# Patient Record
Sex: FEMALE | Race: WHITE | Employment: OTHER | URBAN - METROPOLITAN AREA
[De-identification: names, ages, dates, MRNs, and addresses within clinical notes are randomized per-mention and may not be internally consistent; named-entity substitution may affect disease eponyms.]

---

## 2017-11-14 ENCOUNTER — OFFICE VISIT (OUTPATIENT)
Dept: FAMILY MEDICINE CLINIC | Facility: CLINIC | Age: 32
End: 2017-11-14

## 2017-11-14 VITALS
TEMPERATURE: 98 F | HEIGHT: 71 IN | RESPIRATION RATE: 18 BRPM | WEIGHT: 255 LBS | DIASTOLIC BLOOD PRESSURE: 88 MMHG | BODY MASS INDEX: 35.7 KG/M2 | HEART RATE: 93 BPM | SYSTOLIC BLOOD PRESSURE: 125 MMHG

## 2017-11-14 DIAGNOSIS — F41.9 ANXIETY AND DEPRESSION: ICD-10-CM

## 2017-11-14 DIAGNOSIS — R09.81 NASAL CONGESTION: ICD-10-CM

## 2017-11-14 DIAGNOSIS — M79.7 FIBROMYALGIA: ICD-10-CM

## 2017-11-14 DIAGNOSIS — F32.A ANXIETY AND DEPRESSION: ICD-10-CM

## 2017-11-14 DIAGNOSIS — E11.9 TYPE 2 DIABETES MELLITUS WITHOUT COMPLICATION, WITHOUT LONG-TERM CURRENT USE OF INSULIN (HCC): Primary | ICD-10-CM

## 2017-11-14 PROCEDURE — 99203 OFFICE O/P NEW LOW 30 MIN: CPT | Performed by: FAMILY MEDICINE

## 2017-11-14 PROCEDURE — 99212 OFFICE O/P EST SF 10 MIN: CPT | Performed by: FAMILY MEDICINE

## 2017-11-14 RX ORDER — FLUTICASONE PROPIONATE 50 MCG
SPRAY, SUSPENSION (ML) NASAL DAILY
COMMUNITY
End: 2017-11-14

## 2017-11-14 RX ORDER — CETIRIZINE HYDROCHLORIDE 10 MG/1
10 TABLET ORAL DAILY
COMMUNITY
End: 2020-01-28

## 2017-11-14 RX ORDER — FLUTICASONE PROPIONATE 50 MCG
1 SPRAY, SUSPENSION (ML) NASAL DAILY
Qty: 1 INHALER | Refills: 1 | Status: SHIPPED | OUTPATIENT
Start: 2017-11-14 | End: 2019-04-16

## 2017-11-14 NOTE — PROGRESS NOTES
HPI:    Elinor Frias is a 27year old female presents to clinic to establish care. Recently moved from [facility].   Has Type 2 DM. Last A1C was 8.3 before moving, was started on glipizide but was hesitant to try it.  Takes Metformin twice da Positive for joint pain and myalgias. Skin: Negative. Psychiatric/Behavioral: Positive for depression. The patient is nervous/anxious.           PHYSICAL EXAM:      11/14/17  1436   BP: 125/88   Pulse: 93   Resp: 18   Temp: 98.2 °F (36.8 °C)   TempSrc:

## 2017-11-20 PROBLEM — F41.1 GENERALIZED ANXIETY DISORDER: Status: ACTIVE | Noted: 2017-11-20

## 2017-11-20 PROBLEM — F33.1 MAJOR DEPRESSIVE DISORDER, RECURRENT EPISODE, MODERATE (HCC): Status: ACTIVE | Noted: 2017-11-20

## 2017-11-22 ENCOUNTER — OFFICE VISIT (OUTPATIENT)
Dept: ENDOCRINOLOGY CLINIC | Facility: CLINIC | Age: 32
End: 2017-11-22

## 2017-11-22 VITALS
BODY MASS INDEX: 36.68 KG/M2 | SYSTOLIC BLOOD PRESSURE: 119 MMHG | WEIGHT: 262 LBS | HEART RATE: 87 BPM | HEIGHT: 71 IN | DIASTOLIC BLOOD PRESSURE: 83 MMHG

## 2017-11-22 DIAGNOSIS — E11.65 UNCONTROLLED TYPE 2 DIABETES MELLITUS WITH HYPERGLYCEMIA, WITHOUT LONG-TERM CURRENT USE OF INSULIN (HCC): Primary | ICD-10-CM

## 2017-11-22 DIAGNOSIS — Z13.220 LIPID SCREENING: ICD-10-CM

## 2017-11-22 PROCEDURE — 99244 OFF/OP CNSLTJ NEW/EST MOD 40: CPT | Performed by: INTERNAL MEDICINE

## 2017-11-22 PROCEDURE — 82962 GLUCOSE BLOOD TEST: CPT | Performed by: INTERNAL MEDICINE

## 2017-11-22 PROCEDURE — 36416 COLLJ CAPILLARY BLOOD SPEC: CPT | Performed by: INTERNAL MEDICINE

## 2017-11-22 PROCEDURE — 99212 OFFICE O/P EST SF 10 MIN: CPT | Performed by: INTERNAL MEDICINE

## 2017-11-22 RX ORDER — BLOOD SUGAR DIAGNOSTIC
STRIP MISCELLANEOUS
Qty: 100 STRIP | Refills: 0 | Status: SHIPPED | OUTPATIENT
Start: 2017-11-22

## 2017-11-22 NOTE — H&P
New Patient Visit - Diabetes    CONSULT - Reason for Visit:  Diabetes management.     Requesting Physician: Alfredito Dugan MD    CHIEF COMPLAINT:  Patient presents with:  Diabetes       HISTORY OF PRESENT ILLNESS:   Clovis Teixeira is a 27year old Smokeless tobacco: Never Used                          FAMILY HISTORY:   History reviewed. No pertinent family history.     ASSESSMENTS:        REVIEW OF SYSTEMS:  Constitutional: Negative for: weight change, fever, f Patient understands the importance of glycemic control and the implications of uncontrolled diabetes including Diabetic ketoacidosis and various micro vascular and macrovascular complications. Goal a1c: < 7 %      a).  Medications:    Patient says that s

## 2017-11-29 PROBLEM — F42.3 HOARDING DISORDER: Status: ACTIVE | Noted: 2017-11-29

## 2017-12-15 ENCOUNTER — APPOINTMENT (OUTPATIENT)
Dept: LAB | Age: 32
End: 2017-12-15
Attending: INTERNAL MEDICINE
Payer: COMMERCIAL

## 2017-12-15 DIAGNOSIS — R53.83 FATIGUE, UNSPECIFIED TYPE: ICD-10-CM

## 2017-12-15 DIAGNOSIS — Z13.220 LIPID SCREENING: ICD-10-CM

## 2017-12-15 DIAGNOSIS — E11.65 UNCONTROLLED TYPE 2 DIABETES MELLITUS WITH HYPERGLYCEMIA, WITHOUT LONG-TERM CURRENT USE OF INSULIN (HCC): ICD-10-CM

## 2017-12-15 PROBLEM — M79.7 FIBROMYALGIA: Status: ACTIVE | Noted: 2017-12-15

## 2017-12-15 PROCEDURE — 85027 COMPLETE CBC AUTOMATED: CPT

## 2017-12-15 PROCEDURE — 84443 ASSAY THYROID STIM HORMONE: CPT

## 2017-12-15 PROCEDURE — 82570 ASSAY OF URINE CREATININE: CPT

## 2017-12-15 PROCEDURE — 82043 UR ALBUMIN QUANTITATIVE: CPT

## 2017-12-15 PROCEDURE — 36415 COLL VENOUS BLD VENIPUNCTURE: CPT

## 2017-12-15 PROCEDURE — 80053 COMPREHEN METABOLIC PANEL: CPT

## 2017-12-15 PROCEDURE — 80061 LIPID PANEL: CPT

## 2017-12-15 NOTE — PROGRESS NOTES
Dear Samy Rossi:    I saw your patient Sae Hoover in consultation this afternoon at your request for evaluation of fibromyalgia. As you know, she is a 33-year-old woman who for years has had occasional low back pain, stomach cramps, carpal tunnel. outside. No fevers. She has anorexia but her weight is stable. No rash. Her hair is been thinning 1-1/2 years as did her mother's. No history of internal eye inflammation. She has diabetic eye disease. No oral or nasal ulcers, lymphadenopathy.   No sh

## 2017-12-16 ENCOUNTER — TELEPHONE (OUTPATIENT)
Dept: ENDOCRINOLOGY CLINIC | Facility: CLINIC | Age: 32
End: 2017-12-16

## 2017-12-29 ENCOUNTER — OFFICE VISIT (OUTPATIENT)
Dept: RHEUMATOLOGY | Facility: CLINIC | Age: 32
End: 2017-12-29

## 2017-12-29 VITALS
SYSTOLIC BLOOD PRESSURE: 130 MMHG | BODY MASS INDEX: 36.12 KG/M2 | DIASTOLIC BLOOD PRESSURE: 87 MMHG | WEIGHT: 258 LBS | HEIGHT: 71 IN | HEART RATE: 86 BPM

## 2017-12-29 DIAGNOSIS — E11.65 TYPE 2 DIABETES MELLITUS WITH HYPERGLYCEMIA, WITHOUT LONG-TERM CURRENT USE OF INSULIN (HCC): ICD-10-CM

## 2017-12-29 DIAGNOSIS — M79.7 FIBROMYALGIA: ICD-10-CM

## 2017-12-29 DIAGNOSIS — R53.83 FATIGUE, UNSPECIFIED TYPE: Primary | ICD-10-CM

## 2017-12-29 PROCEDURE — 99213 OFFICE O/P EST LOW 20 MIN: CPT | Performed by: INTERNAL MEDICINE

## 2017-12-29 PROCEDURE — 99212 OFFICE O/P EST SF 10 MIN: CPT | Performed by: INTERNAL MEDICINE

## 2017-12-29 NOTE — PROGRESS NOTES
Dear Montserrat Da Silva:    I saw Daylin Barrow in follow-up this afternoon in my rheumatology clinic. After I saw her several weeks ago, she did very well and was sleeping fine, as long as she walks her dog.   Yesterday she did a lot of housework, and since ha

## 2018-01-02 ENCOUNTER — TELEPHONE (OUTPATIENT)
Dept: RHEUMATOLOGY | Facility: CLINIC | Age: 33
End: 2018-01-02

## 2018-01-03 NOTE — TELEPHONE ENCOUNTER
Left message sleep study does not require a PA. Left message for pt to call sleep center at [phone] and schedule.  Insurance contacted at [phone], USD#[phone]

## 2018-01-19 ENCOUNTER — PATIENT MESSAGE (OUTPATIENT)
Dept: ENDOCRINOLOGY CLINIC | Facility: CLINIC | Age: 33
End: 2018-01-19

## 2018-01-19 NOTE — TELEPHONE ENCOUNTER
From: Scott Crawford  To: Carolina Alanis MD  Sent: 1/19/2018 2:39 PM CST  Subject: Prescription Question    [facility]    I have run out of Metformin that I had from [place]. Can I please request a refill?     [facility]

## 2018-02-10 ENCOUNTER — PATIENT MESSAGE (OUTPATIENT)
Dept: FAMILY MEDICINE CLINIC | Facility: CLINIC | Age: 33
End: 2018-02-10

## 2018-02-10 ENCOUNTER — TELEPHONE (OUTPATIENT)
Dept: FAMILY MEDICINE CLINIC | Facility: CLINIC | Age: 33
End: 2018-02-10

## 2018-02-10 NOTE — TELEPHONE ENCOUNTER
From: Alison Saba  To: Susan Ocasio MD  Sent: 2/10/2018 10:17 AM CST  Subject: Non-Urgent Lashon Clarity Dr,    I am in [place] where it’s 70 degrees, taking care of my ill mother.  The night before I left I got a cough with phlegm

## 2018-02-10 NOTE — TELEPHONE ENCOUNTER
From: Kari Bryant  To: Carlos Gasca MD  Sent: 2/10/2018 10:17 AM CST  Subject: Non-Urgent Reford Naomi Abel,    I am in [place] where it’s 70 degrees, taking care  of my ill mother.  The night before I left I got a cough with phlegm
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
## 2018-03-14 ENCOUNTER — OFFICE VISIT (OUTPATIENT)
Dept: FAMILY MEDICINE CLINIC | Facility: CLINIC | Age: 33
End: 2018-03-14

## 2018-03-14 ENCOUNTER — OFFICE VISIT (OUTPATIENT)
Dept: ENDOCRINOLOGY CLINIC | Facility: CLINIC | Age: 33
End: 2018-03-14

## 2018-03-14 VITALS
OXYGEN SATURATION: 98 % | SYSTOLIC BLOOD PRESSURE: 129 MMHG | WEIGHT: 257 LBS | DIASTOLIC BLOOD PRESSURE: 90 MMHG | RESPIRATION RATE: 14 BRPM | HEART RATE: 87 BPM | TEMPERATURE: 97 F | BODY MASS INDEX: 36.79 KG/M2 | HEIGHT: 70 IN

## 2018-03-14 VITALS
BODY MASS INDEX: 36.79 KG/M2 | HEIGHT: 70 IN | DIASTOLIC BLOOD PRESSURE: 89 MMHG | SYSTOLIC BLOOD PRESSURE: 128 MMHG | WEIGHT: 257 LBS | HEART RATE: 91 BPM

## 2018-03-14 DIAGNOSIS — J30.2 SEASONAL ALLERGIC RHINITIS, UNSPECIFIED TRIGGER: Primary | ICD-10-CM

## 2018-03-14 DIAGNOSIS — F41.9 ANXIETY: ICD-10-CM

## 2018-03-14 DIAGNOSIS — E11.65 UNCONTROLLED TYPE 2 DIABETES MELLITUS WITH HYPERGLYCEMIA, WITHOUT LONG-TERM CURRENT USE OF INSULIN (HCC): Primary | ICD-10-CM

## 2018-03-14 LAB
CARTRIDGE LOT#: ABNORMAL NUMERIC
GLUCOSE BLOOD: 134
HEMOGLOBIN A1C: 7.3 % (ref 4.3–5.6)
TEST STRIP LOT #: NORMAL NUMERIC

## 2018-03-14 PROCEDURE — 99213 OFFICE O/P EST LOW 20 MIN: CPT | Performed by: INTERNAL MEDICINE

## 2018-03-14 PROCEDURE — 99214 OFFICE O/P EST MOD 30 MIN: CPT | Performed by: FAMILY MEDICINE

## 2018-03-14 PROCEDURE — 82962 GLUCOSE BLOOD TEST: CPT | Performed by: INTERNAL MEDICINE

## 2018-03-14 PROCEDURE — 83036 HEMOGLOBIN GLYCOSYLATED A1C: CPT | Performed by: INTERNAL MEDICINE

## 2018-03-14 PROCEDURE — 36416 COLLJ CAPILLARY BLOOD SPEC: CPT | Performed by: INTERNAL MEDICINE

## 2018-03-14 PROCEDURE — 99212 OFFICE O/P EST SF 10 MIN: CPT | Performed by: INTERNAL MEDICINE

## 2018-03-14 PROCEDURE — 99212 OFFICE O/P EST SF 10 MIN: CPT | Performed by: FAMILY MEDICINE

## 2018-03-14 RX ORDER — MONTELUKAST SODIUM 10 MG/1
10 TABLET ORAL NIGHTLY
Qty: 90 TABLET | Refills: 0 | Status: SHIPPED | OUTPATIENT
Start: 2018-03-14 | End: 2019-04-24

## 2018-03-14 NOTE — PROGRESS NOTES
Return Office Visit     CHIEF COMPLAINT:  Patient presents with:  Diabetes       HISTORY OF PRESENT ILLNESS:  Cesar Rivera is a 27year old female who presents for follow up for for DM.      DM HISTORY  Diagnosed: 2015        HISTORY OF DIABETES route daily. Disp: 1 Inhaler Rfl: 1   Cyclobenzaprine HCl 5 MG Oral Tab Take 1-2 PO Q HS.  Disp: 60 tablet Rfl: 2         ALLERGY:    Amoxicillin             Swelling  Dust                      Pollen                    Smoke                       PAST MEDI regular rate and rhythm, normal S1 and S2  ABDOMEN:  normal bowel sounds, soft, non-distended, non-tender  SKIN:  no bruising or bleeding, no rashes and no lesions  EXTREMITIES: normal pulses, no edema      DATA:     Labs reviewed      She had an A1c of 8. [[id number]]      3/14/2018  Maryanne Martins MD

## 2018-03-16 ENCOUNTER — PATIENT MESSAGE (OUTPATIENT)
Dept: FAMILY MEDICINE CLINIC | Facility: CLINIC | Age: 33
End: 2018-03-16

## 2018-03-16 ENCOUNTER — NURSE TRIAGE (OUTPATIENT)
Dept: OTHER | Age: 33
End: 2018-03-16

## 2018-03-16 NOTE — PROGRESS NOTES
HPI:    Mateo Mulligan is a 56 [address] Stre[address] old female presents to clinic with concerns regarding her allergies.  States that she was in  [place] in a jimmy, humid environment, notes that she was experiencing headaches, and increase in congestion, and Suspension 1 spray by Each Nare route daily. Disp: 1 Inhaler Rfl: 1   MetFORMIN HCl 500 MG Oral Tab Take 2 tablets (1,000 mg total) by mouth 2 (two) times daily with meals. Disp: 360 tablet Rfl: 0   Cyclobenzaprine HCl 5 MG Oral Tab Take 1-2 PO Q HS.  Disp: breath sounds normal. No respiratory distress. She has no wheezes. She has no rales. Lymphadenopathy:     She has no cervical adenopathy. Neurological: She is alert. Gait normal.   Vitals reviewed.     ASSESSMENT/PLAN:   (J30.2) Seasonal allergic rhinit

## 2018-03-16 NOTE — TELEPHONE ENCOUNTER
From: Tiffanie Boothe  To: Javon Remy MD  Sent: 3/16/2018  1:36 PM CDT  Subject: Non-Urgent Medical Question    Hi Dr Kelly Titus,    I bent over to pick something up and I had shooting pain in my lower back, left hip, and down my left leg.  I need

## 2018-03-16 NOTE — TELEPHONE ENCOUNTER
Advised patient of Dr. Marialuisa Macias note. Patient verbalized understanding    States she still has some Flexeril and will try it tonight. States she tried Advil, but it is only helping a little.

## 2018-03-16 NOTE — TELEPHONE ENCOUNTER
From: Thad Collins  To: Laina Potter MD  Sent: 3/16/2018 1:36 PM CDT  Subject: Non-Urgent Medical Question    Hi Dr eGmini Alcantara,    I bent over to pick something up and I had shooting pain in my lower back, left hip, and down my left leg.  I neede

## 2018-03-16 NOTE — TELEPHONE ENCOUNTER
Action Requested: Summary for Provider     []  Critical Lab, Recommendations Needed  [] Need Additional Advice  [x]   FYI    []   Need Orders  [] Need Medications Sent to Pharmacy  []  Other     SUMMARY: Contacted patient in regard to My Chart message.    P

## 2018-05-29 RX ORDER — ALBUTEROL SULFATE 90 UG/1
2 AEROSOL, METERED RESPIRATORY (INHALATION) EVERY 4 HOURS PRN
Qty: 1 INHALER | Refills: 0 | Status: SHIPPED | OUTPATIENT
Start: 2018-05-29 | End: 2019-04-16

## 2018-05-29 NOTE — TELEPHONE ENCOUNTER
Please advise on refill request.     Refill Protocol Appointment Criteria  · Appointment scheduled in the past 6 months or in the next 3 months  Recent Outpatient Visits            2 months ago Uncontrolled type 2 diabetes mellitus with hyperglycemia, with

## 2018-06-25 ENCOUNTER — PATIENT MESSAGE (OUTPATIENT)
Dept: ENDOCRINOLOGY CLINIC | Facility: CLINIC | Age: 33
End: 2018-06-25

## 2018-06-26 RX ORDER — GLIPIZIDE 2.5 MG/1
2.5 TABLET, EXTENDED RELEASE ORAL
Qty: 90 TABLET | Refills: 0 | Status: SHIPPED | OUTPATIENT
Start: 2018-06-26 | End: 2018-09-22

## 2018-06-26 NOTE — TELEPHONE ENCOUNTER
From: Doreen Rising  To: Lizeth Sanon MD  Sent: 6/25/2018 6:46 PM CDT  Subject: Alla Gates Dr,    I haven't been doing my sugars strictly but maybe once to twice a week and they are typically 130-170 every morning.  I am wr

## 2018-07-09 ENCOUNTER — TELEPHONE (OUTPATIENT)
Dept: ENDOCRINOLOGY CLINIC | Facility: CLINIC | Age: 33
End: 2018-07-09

## 2018-07-09 NOTE — TELEPHONE ENCOUNTER
Regarding: Non-Urgent Medical Question  Contact: [phone]  ----- Message from Cherylene Mis, RN sent at 7/9/2018  1:50 PM CDT -----       ----- Message from Daylin Barrow to Obdulia Olivera MD sent at 7/9/2018  1:20 PM -----   Tanner Luther

## 2018-07-09 NOTE — TELEPHONE ENCOUNTER
Patient returned call but was disconnected. If calls back ok to offer sooner appt with AM than she has booked for regular DM follow up.

## 2018-07-09 NOTE — TELEPHONE ENCOUNTER
Pt calling back - I offered her appt for tomorrow but she cannot take it - next opening is 7/23 - pls advise

## 2018-07-10 PROBLEM — F33.0 MAJOR DEPRESSIVE DISORDER, RECURRENT EPISODE, MILD: Status: ACTIVE | Noted: 2018-07-10

## 2018-07-10 PROBLEM — F33.0 MAJOR DEPRESSIVE DISORDER, RECURRENT EPISODE, MILD (HCC): Status: ACTIVE | Noted: 2018-07-10

## 2018-07-11 NOTE — TELEPHONE ENCOUNTER
Called the patient. Double booked for Monday afternoon as it was the time patient could come in to be seen.

## 2018-07-16 ENCOUNTER — OFFICE VISIT (OUTPATIENT)
Dept: ENDOCRINOLOGY CLINIC | Facility: CLINIC | Age: 33
End: 2018-07-16

## 2018-07-16 VITALS
TEMPERATURE: 98 F | HEIGHT: 70 IN | SYSTOLIC BLOOD PRESSURE: 127 MMHG | HEART RATE: 89 BPM | DIASTOLIC BLOOD PRESSURE: 89 MMHG | BODY MASS INDEX: 37.51 KG/M2 | WEIGHT: 262 LBS

## 2018-07-16 DIAGNOSIS — E11.8 TYPE 2 DIABETES MELLITUS WITH COMPLICATION, WITHOUT LONG-TERM CURRENT USE OF INSULIN (HCC): Primary | ICD-10-CM

## 2018-07-16 LAB
CARTRIDGE LOT#: ABNORMAL NUMERIC
GLUCOSE BLOOD: 149
HEMOGLOBIN A1C: 8.4 % (ref 4.3–5.6)
TEST STRIP LOT #: NORMAL NUMERIC

## 2018-07-16 PROCEDURE — 36416 COLLJ CAPILLARY BLOOD SPEC: CPT | Performed by: INTERNAL MEDICINE

## 2018-07-16 PROCEDURE — 82962 GLUCOSE BLOOD TEST: CPT | Performed by: INTERNAL MEDICINE

## 2018-07-16 PROCEDURE — 83036 HEMOGLOBIN GLYCOSYLATED A1C: CPT | Performed by: INTERNAL MEDICINE

## 2018-07-16 PROCEDURE — 99213 OFFICE O/P EST LOW 20 MIN: CPT | Performed by: INTERNAL MEDICINE

## 2018-07-16 PROCEDURE — 99212 OFFICE O/P EST SF 10 MIN: CPT | Performed by: INTERNAL MEDICINE

## 2018-07-16 NOTE — PROGRESS NOTES
Return Office Visit     CHIEF COMPLAINT:  Patient presents with:  Diabetes: Patient is a 27year old female here for follow up.  LOV was in 3/14/18       HISTORY OF PRESENT ILLNESS:  Agueda Parada is a 27year old female who presents for follow up Fluticasone Propionate 50 MCG/ACT Nasal Suspension 1 spray by Each Nare route daily.  Disp: 1 Inhaler Rfl: 1         ALLERGY:    Amoxicillin             SWELLING  Dust                      Pollen                    Smoke                       PAST MEDICAL crackles or wheezing  CARDIOVASCULAR:  regular rate and rhythm, normal S1 and S2  ABDOMEN:  normal bowel sounds, soft, non-distended, non-tender  SKIN:  no bruising or bleeding, no rashes and no lesions  EXTREMITIES: normal pulses, no edema      DATA: pregnant.      RTC in three months.    She will my chart/ call as discussed above.          Orders Placed This Encounter      POC Glycohemoglobin [63740]      POC Finger stick glucose Donavon Lam MD

## 2018-09-15 ENCOUNTER — TELEPHONE (OUTPATIENT)
Dept: ENDOCRINOLOGY CLINIC | Facility: CLINIC | Age: 33
End: 2018-09-15

## 2018-09-24 RX ORDER — GLIPIZIDE 2.5 MG/1
2.5 TABLET, EXTENDED RELEASE ORAL
Qty: 90 TABLET | Refills: 0 | Status: SHIPPED | OUTPATIENT
Start: 2018-09-24 | End: 2018-12-28

## 2018-10-24 ENCOUNTER — OFFICE VISIT (OUTPATIENT)
Dept: ENDOCRINOLOGY CLINIC | Facility: CLINIC | Age: 33
End: 2018-10-24
Payer: COMMERCIAL

## 2018-10-24 VITALS
SYSTOLIC BLOOD PRESSURE: 131 MMHG | DIASTOLIC BLOOD PRESSURE: 87 MMHG | BODY MASS INDEX: 36.48 KG/M2 | WEIGHT: 254.81 LBS | HEIGHT: 70 IN | HEART RATE: 86 BPM

## 2018-10-24 DIAGNOSIS — E66.9 OBESITY WITHOUT SERIOUS COMORBIDITY, UNSPECIFIED CLASSIFICATION, UNSPECIFIED OBESITY TYPE: ICD-10-CM

## 2018-10-24 DIAGNOSIS — E11.65 UNCONTROLLED TYPE 2 DIABETES MELLITUS WITH HYPERGLYCEMIA (HCC): Primary | ICD-10-CM

## 2018-10-24 PROCEDURE — 99212 OFFICE O/P EST SF 10 MIN: CPT | Performed by: INTERNAL MEDICINE

## 2018-10-24 PROCEDURE — 83036 HEMOGLOBIN GLYCOSYLATED A1C: CPT | Performed by: INTERNAL MEDICINE

## 2018-10-24 PROCEDURE — 36416 COLLJ CAPILLARY BLOOD SPEC: CPT | Performed by: INTERNAL MEDICINE

## 2018-10-24 PROCEDURE — 82962 GLUCOSE BLOOD TEST: CPT | Performed by: INTERNAL MEDICINE

## 2018-10-24 PROCEDURE — 99214 OFFICE O/P EST MOD 30 MIN: CPT | Performed by: INTERNAL MEDICINE

## 2018-10-24 RX ORDER — LORATADINE 10 MG/1
10 TABLET ORAL DAILY
COMMUNITY
End: 2021-09-16

## 2018-10-24 NOTE — PROGRESS NOTES
Return Office Visit     CHIEF COMPLAINT:  Patient presents with:  Diabetes: Retinal scan report in room       HISTORY OF PRESENT ILLNESS:  Kelly Grace is a 26year old female who presents for follow up for for DM.      DM HISTORY  Diagnosed: 201 Oral Tab Take 1 tablet (10 mg total) by mouth nightly. Disp: 90 tablet Rfl: 0   Cyclobenzaprine HCl 5 MG Oral Tab Take 1-2 PO Q HS. (Patient taking differently: as needed.  Take 1-2 PO Q HS. ) Disp: 60 tablet Rfl: 2   cetirizine 10 MG Oral Tab Take 10 mg by wheezing  CARDIOVASCULAR:  regular rate and rhythm, normal S1 and S2  ABDOMEN:  normal bowel sounds, soft, non-distended, non-tender  SKIN:  no bruising or bleeding, no rashes and no lesions  EXTREMITIES: normal pulses, no edema      DATA:     Labs reviewe MD

## 2018-11-11 ENCOUNTER — PATIENT MESSAGE (OUTPATIENT)
Dept: FAMILY MEDICINE CLINIC | Facility: CLINIC | Age: 33
End: 2018-11-11

## 2018-11-12 ENCOUNTER — TELEPHONE (OUTPATIENT)
Dept: OTHER | Age: 33
End: 2018-11-12

## 2018-11-12 NOTE — TELEPHONE ENCOUNTER
Note      From: Codie Rossi  To: Jeanna Carrillo MD  Sent: 11/11/2018  9:18 AM CST  Subject: Other    Hi Dr Kathleen Nolasco     I have been feeling ill for quite sometime about a week or two. Feeling congested, coughing, headaches, and more.      I am h

## 2018-11-12 NOTE — TELEPHONE ENCOUNTER
From: Winferd Goltz  To: Wilmer Carvajal MD  Sent: 11/11/2018 9:18 AM CST  Subject: Other    Hi Dr Parisa Miller     I have been feeling ill for quite sometime about a week or two. Feeling congested, coughing, headaches, and more.      I am hoping to co

## 2018-11-13 ENCOUNTER — OFFICE VISIT (OUTPATIENT)
Dept: FAMILY MEDICINE CLINIC | Facility: CLINIC | Age: 33
End: 2018-11-13
Payer: COMMERCIAL

## 2018-11-13 VITALS
SYSTOLIC BLOOD PRESSURE: 123 MMHG | TEMPERATURE: 98 F | DIASTOLIC BLOOD PRESSURE: 88 MMHG | HEART RATE: 96 BPM | BODY MASS INDEX: 36 KG/M2 | WEIGHT: 254 LBS

## 2018-11-13 DIAGNOSIS — R05.3 PERSISTENT COUGH: Primary | ICD-10-CM

## 2018-11-13 PROCEDURE — 99212 OFFICE O/P EST SF 10 MIN: CPT | Performed by: FAMILY MEDICINE

## 2018-11-13 PROCEDURE — 99213 OFFICE O/P EST LOW 20 MIN: CPT | Performed by: FAMILY MEDICINE

## 2018-11-13 RX ORDER — AZITHROMYCIN 250 MG/1
TABLET, FILM COATED ORAL
Qty: 6 TABLET | Refills: 0 | Status: SHIPPED | OUTPATIENT
Start: 2018-11-13 | End: 2019-04-24

## 2018-11-19 NOTE — PROGRESS NOTES
HPI:    Javan Edmond is a 26year old female presents to clinic with a 10 day history of chills and a cough. Patient has been congested. Feels like there is congestion in her chest that she cant cough up. Notes fatigue and a loss of appetite.  D Take 1,000 mg by mouth every 6 (six) hours as needed. Disp:  Rfl:    Cyclobenzaprine HCl 5 MG Oral Tab Take 1-2 PO Q HS. (Patient taking differently: as needed.  Take 1-2 PO Q HS. ) Disp: 60 tablet Rfl: 2   Glucose Blood (ONETOUCH VERIO) In Vitro [employer] encounter. Meds This Visit:  Requested Prescriptions     Signed Prescriptions Disp Refills   • azithromycin 250 MG Oral Tab 6 tablet 0     Sig: Take two tablets by mouth today, then one daily.        Imaging & Referrals:  None       11/19/2018  Geoffrey Boyer

## 2018-12-20 ENCOUNTER — PATIENT MESSAGE (OUTPATIENT)
Dept: FAMILY MEDICINE CLINIC | Facility: CLINIC | Age: 33
End: 2018-12-20

## 2018-12-21 NOTE — TELEPHONE ENCOUNTER
From: Messi Novak  To: Jessica Castano MD  Sent: 12/20/2018 8:07 AM CST  Subject: Non-Urgent Medical Question    Hi Dr Maxim Lucero,    I am wondering how I would go about having my thyroid tested?  The reason I am asking is because I have horrible n

## 2018-12-28 RX ORDER — GLIPIZIDE 2.5 MG/1
2.5 TABLET, EXTENDED RELEASE ORAL
Qty: 90 TABLET | Refills: 0 | Status: SHIPPED | OUTPATIENT
Start: 2018-12-28 | End: 2019-04-10

## 2018-12-30 ENCOUNTER — HOSPITAL ENCOUNTER (OUTPATIENT)
Age: 33
Discharge: HOME | End: 2018-12-30
Attending: FAMILY MEDICINE
Payer: COMMERCIAL

## 2018-12-30 VITALS
HEART RATE: 82 BPM | DIASTOLIC BLOOD PRESSURE: 90 MMHG | RESPIRATION RATE: 18 BRPM | TEMPERATURE: 98 F | SYSTOLIC BLOOD PRESSURE: 127 MMHG | OXYGEN SATURATION: 100 %

## 2018-12-30 DIAGNOSIS — J02.0 STREPTOCOCCAL SORE THROAT: Primary | ICD-10-CM

## 2018-12-30 LAB — S PYO AG THROAT QL: POSITIVE

## 2018-12-30 PROCEDURE — 87430 STREP A AG IA: CPT

## 2018-12-30 PROCEDURE — 99204 OFFICE O/P NEW MOD 45 MIN: CPT

## 2018-12-30 PROCEDURE — 99213 OFFICE O/P EST LOW 20 MIN: CPT

## 2018-12-30 RX ORDER — CLARITHROMYCIN 500 MG/1
500 TABLET, COATED ORAL 2 TIMES DAILY
Qty: 20 TABLET | Refills: 0 | Status: SHIPPED | OUTPATIENT
Start: 2018-12-30 | End: 2019-01-09

## 2018-12-30 NOTE — ED INITIAL ASSESSMENT (HPI)
Pt here with complaints of bilateral ear pain and sore throat that started 2 days ago, pt states her ears feels like they are burning and it hurts to swallow, pt denies any fevers but states she has had chills

## 2019-01-03 ENCOUNTER — PATIENT MESSAGE (OUTPATIENT)
Dept: FAMILY MEDICINE CLINIC | Facility: CLINIC | Age: 34
End: 2019-01-03

## 2019-01-03 ENCOUNTER — NURSE TRIAGE (OUTPATIENT)
Dept: OTHER | Age: 34
End: 2019-01-03

## 2019-01-04 RX ORDER — AZITHROMYCIN 250 MG/1
TABLET, FILM COATED ORAL
Qty: 6 TABLET | Refills: 0 | Status: SHIPPED | OUTPATIENT
Start: 2019-01-04 | End: 2019-04-24

## 2019-01-04 NOTE — TELEPHONE ENCOUNTER
Action Requested: Summary for Provider     []  Critical Lab, Recommendations Needed  [] Need Additional Advice  []   FYI    []   Need Orders  [] Need Medications Sent to Pharmacy  []  Other     SUMMARY: Dr. Loyda Moser: do you recommend a different abx?     Re

## 2019-01-04 NOTE — TELEPHONE ENCOUNTER
Regarding: Visit Follow-up Question  Contact: [phone]  ----- Message from [address] [place] [address] [address] Generic sent at 1/3/2019 10:18 AM CST -----    Tanner Henriquez,    I went to the urgent care on Sunday and was diagnosed with strep throat.  I was prescribed Clarithrom

## 2019-01-04 NOTE — TELEPHONE ENCOUNTER
From: Doreen Rising  To: Hugo Barrett MD  Sent: 1/3/2019 10:18 AM CST  Subject: Visit Blanche Palencia,    I went to the urgent care on Sunday and was diagnosed with strep throat.  I was prescribed Clarithromycin that is v

## 2019-01-05 NOTE — TELEPHONE ENCOUNTER
The patient stated she did not stop the Clarithromycin due to sinus issues. She is asking if you want her to continue to take the Clarithromycin  And than start the Azithromycin after. Or should she just start the Azithromycin?

## 2019-01-05 NOTE — TELEPHONE ENCOUNTER
From what I can tell, the Azithormycin was called in because she didn't tolerate the Clarithromycin. If she finishes the Clarithromycin, she does not need to take the Azithromycin.

## 2019-01-05 NOTE — TELEPHONE ENCOUNTER
Verified pt name and . Reviewed doctor's medication instructions as noted below. Pt states she feels better today, will continue taking Clarithromycin as long as she is not having any side effects from the medication.     Pt now asking if the Clarithromy

## 2019-02-18 RX ORDER — CYCLOBENZAPRINE HCL 5 MG
TABLET ORAL
Qty: 60 TABLET | Refills: 0 | Status: SHIPPED | OUTPATIENT
Start: 2019-02-18 | End: 2020-01-28

## 2019-02-18 NOTE — TELEPHONE ENCOUNTER
Requested medication: Cyclobenzaprine HCl 5 MG Oral Tab  Last refilled: 12/15/17 #60 tablet 2 refills  LOV: 12/29/17  Future Appointments   Date Time Provider Rachana Hayden   2/22/2019 11:00 AM Angie Kaplan PsyD [facility]   3/13/2019 10:15

## 2019-02-19 NOTE — TELEPHONE ENCOUNTER
Phoned patient and left a message on her voice mail to schedule a f/u with  [facility] for further refills.

## 2019-03-13 ENCOUNTER — OFFICE VISIT (OUTPATIENT)
Dept: ENDOCRINOLOGY CLINIC | Facility: CLINIC | Age: 34
End: 2019-03-13
Payer: COMMERCIAL

## 2019-03-13 VITALS
DIASTOLIC BLOOD PRESSURE: 85 MMHG | SYSTOLIC BLOOD PRESSURE: 124 MMHG | WEIGHT: 258.38 LBS | BODY MASS INDEX: 37 KG/M2 | HEART RATE: 84 BPM

## 2019-03-13 DIAGNOSIS — E11.65 TYPE 2 DIABETES MELLITUS WITH HYPERGLYCEMIA, WITHOUT LONG-TERM CURRENT USE OF INSULIN (HCC): Primary | ICD-10-CM

## 2019-03-13 LAB
CARTRIDGE LOT#: ABNORMAL NUMERIC
GLUCOSE BLOOD: 129
HEMOGLOBIN A1C: 7.3 % (ref 4.3–5.6)
TEST STRIP LOT #: NORMAL NUMERIC

## 2019-03-13 PROCEDURE — 82962 GLUCOSE BLOOD TEST: CPT | Performed by: INTERNAL MEDICINE

## 2019-03-13 PROCEDURE — 83036 HEMOGLOBIN GLYCOSYLATED A1C: CPT | Performed by: INTERNAL MEDICINE

## 2019-03-13 PROCEDURE — 99212 OFFICE O/P EST SF 10 MIN: CPT | Performed by: INTERNAL MEDICINE

## 2019-03-13 PROCEDURE — 99213 OFFICE O/P EST LOW 20 MIN: CPT | Performed by: INTERNAL MEDICINE

## 2019-03-13 PROCEDURE — 36416 COLLJ CAPILLARY BLOOD SPEC: CPT | Performed by: INTERNAL MEDICINE

## 2019-03-13 NOTE — PROGRESS NOTES
Return Office Visit     CHIEF COMPLAINT:  Type 2 DM        HISTORY OF PRESENT ILLNESS:  Anju Locke is a 26year old female who presents for follow up for DM.      DM HISTORY  Diagnosed: 2015        HISTORY OF DIABETES COMPLICATIONS: :  History then one daily. Disp: 6 tablet Rfl: 0   azithromycin 250 MG Oral Tab Take two tablets by mouth today, then one daily. Disp: 6 tablet Rfl: 0   Montelukast Sodium 10 MG Oral Tab Take 1 tablet (10 mg total) by mouth nightly.  Disp: 90 tablet Rfl: 0   cetirizin symmetric, not enlarged and no tenderness  LUNGS: clear to auscultation bilaterally, no crackles or wheezing  CARDIOVASCULAR:  regular rate and rhythm, normal S1 and S2  ABDOMEN:  normal bowel sounds, soft, non-distended, non-tender  SKIN:  no bruising or annual labs.      Orders Placed This Encounter      POC Glycohemoglobin [[id number]]      POC Finger stick glucose Martina Barksdale MD

## 2019-04-02 ENCOUNTER — PATIENT MESSAGE (OUTPATIENT)
Dept: ENDOCRINOLOGY CLINIC | Facility: CLINIC | Age: 34
End: 2019-04-02

## 2019-04-02 NOTE — TELEPHONE ENCOUNTER
Please let her know that we can not say  Specifically that she should get two days off in  A row, but we can say the following:     Please send:     To Whom It May Concern,     Sae Hoover  is currently being treated by me at [address]

## 2019-04-02 NOTE — TELEPHONE ENCOUNTER
Per AM letter written and sent to pt via [facility] [place]. Pt made aware.

## 2019-04-02 NOTE — TELEPHONE ENCOUNTER
From: Sheryl Reyes  To: Ricardo Daly MD  Sent: 4/2/2019 2:14 PM CDT  Subject: Non-Urgent [facility] Dr Bertha Barajas,    I will provide those sugars for you this weekend.  But I do need that drs note for two days off in a row and t

## 2019-04-10 RX ORDER — GLIPIZIDE 2.5 MG/1
2.5 TABLET, EXTENDED RELEASE ORAL
Qty: 90 TABLET | Refills: 0 | Status: SHIPPED | OUTPATIENT
Start: 2019-04-10 | End: 2019-07-14

## 2019-04-17 RX ORDER — FLUTICASONE PROPIONATE 50 MCG
1 SPRAY, SUSPENSION (ML) NASAL DAILY
Qty: 3 INHALER | Refills: 1 | Status: SHIPPED | OUTPATIENT
Start: 2019-04-17 | End: 2021-09-16

## 2019-04-17 RX ORDER — ALBUTEROL SULFATE 90 UG/1
2 AEROSOL, METERED RESPIRATORY (INHALATION) EVERY 4 HOURS PRN
Qty: 3 INHALER | Refills: 1 | Status: SHIPPED | OUTPATIENT
Start: 2019-04-17 | End: 2020-03-15

## 2019-04-17 NOTE — TELEPHONE ENCOUNTER
Refill passed per [facility] protocol. Requested Prescriptions   Pending Prescriptions Disp Refills   • Fluticasone Propionate 50 MCG/ACT Nasal Suspension 1 Inhaler 1     Si spray by Each Nare route daily.        Allergy Medication Protocol Pass

## 2019-04-17 NOTE — TELEPHONE ENCOUNTER
Per refill protocol, #3#1 approved    Requested Prescriptions   Pending Prescriptions Disp Refills   • Albuterol Sulfate HFA (VENTOLIN HFA) 108 (90 Base) MCG/ACT Inhalation Aero Soln 1 Inhaler 0     Sig: Inhale 2 puffs into the lungs every 4 (four) hours a

## 2019-04-24 ENCOUNTER — OFFICE VISIT (OUTPATIENT)
Dept: FAMILY MEDICINE CLINIC | Facility: CLINIC | Age: 34
End: 2019-04-24
Payer: COMMERCIAL

## 2019-04-24 VITALS
SYSTOLIC BLOOD PRESSURE: 132 MMHG | BODY MASS INDEX: 38 KG/M2 | WEIGHT: 262.19 LBS | HEART RATE: 111 BPM | TEMPERATURE: 98 F | DIASTOLIC BLOOD PRESSURE: 88 MMHG | OXYGEN SATURATION: 99 %

## 2019-04-24 DIAGNOSIS — J01.00 ACUTE NON-RECURRENT MAXILLARY SINUSITIS: Primary | ICD-10-CM

## 2019-04-24 PROCEDURE — 99213 OFFICE O/P EST LOW 20 MIN: CPT | Performed by: FAMILY MEDICINE

## 2019-04-24 PROCEDURE — 99212 OFFICE O/P EST SF 10 MIN: CPT | Performed by: FAMILY MEDICINE

## 2019-04-24 RX ORDER — AZITHROMYCIN 250 MG/1
TABLET, FILM COATED ORAL
Qty: 6 TABLET | Refills: 0 | Status: SHIPPED | OUTPATIENT
Start: 2019-04-24 | End: 2019-06-04

## 2019-04-24 NOTE — PROGRESS NOTES
HPI:    Tiffanie Boothe is a 26year old female presents to clinic with a 10-day history of nasal congestion, headaches, cough, and fatigue. Patient also feels clammy and sweaty, notes her body feels hot at night but she has not measured a temp. Glucose Blood (ONETOUCH VERIO) In Vitro Strip Check 2-3 times a day. Disp: 100 strip Rfl: 0   cetirizine 10 MG Oral Tab Take 10 mg by mouth daily.  Disp:  Rfl:        Allergies:    Amoxicillin             SWELLING  Dust                      Pollen This Visit:  Requested Prescriptions      No prescriptions requested or ordered in this encounter       Imaging & Referrals:  None       4/24/2019  Nickolas Munoz MD

## 2019-06-03 ENCOUNTER — NURSE TRIAGE (OUTPATIENT)
Dept: FAMILY MEDICINE CLINIC | Facility: CLINIC | Age: 34
End: 2019-06-03

## 2019-06-03 NOTE — TELEPHONE ENCOUNTER
----- Message from Daylin Barrow sent at 6/2/2019 11:23 AM CDT -----  Regarding: Other  Contact: [phone]  Tanner Limon,    Sorry to interrupt any ones Sunday. This morning I had a scare.  I was doing my hair and my chest (center) got

## 2019-06-03 NOTE — TELEPHONE ENCOUNTER
Spoke to Dr. Avendano Eastland. Per Dr. Argueta Pretty, it is appropriate for patient to see Dr. Parisa Miller tomorrow at 11 a.m.

## 2019-06-03 NOTE — TELEPHONE ENCOUNTER
Patient informed Dr. Leslie Palencia will not be in the office. Instructed patient to go to immediate care.    Per patient, she will go to immediate care but she wants to be called if there is a cancellation with the other providers because her  has an ap

## 2019-06-03 NOTE — TELEPHONE ENCOUNTER
Patient scheduled appointment for chest pains/aches (see triage note below). Advised to go to immediate care. Patient stated she will go to immediate care however scheduled an appointment with Dr. Wallace Henriquez tomorrow at 11:00 a.m.      Dr. Nishant Gomez:   Nicolas

## 2019-06-04 ENCOUNTER — HOSPITAL ENCOUNTER (OUTPATIENT)
Dept: GENERAL RADIOLOGY | Age: 34
Discharge: HOME | End: 2019-06-04
Attending: FAMILY MEDICINE
Payer: COMMERCIAL

## 2019-06-04 ENCOUNTER — LAB ENCOUNTER (OUTPATIENT)
Dept: LAB | Age: 34
End: 2019-06-04
Attending: FAMILY MEDICINE
Payer: COMMERCIAL

## 2019-06-04 ENCOUNTER — OFFICE VISIT (OUTPATIENT)
Dept: FAMILY MEDICINE CLINIC | Facility: CLINIC | Age: 34
End: 2019-06-04
Payer: COMMERCIAL

## 2019-06-04 VITALS
OXYGEN SATURATION: 99 % | TEMPERATURE: 97 F | HEIGHT: 70 IN | BODY MASS INDEX: 37.42 KG/M2 | SYSTOLIC BLOOD PRESSURE: 116 MMHG | DIASTOLIC BLOOD PRESSURE: 78 MMHG | HEART RATE: 81 BPM | WEIGHT: 261.38 LBS

## 2019-06-04 DIAGNOSIS — Z80.3 FAMILY HISTORY OF BREAST CANCER: ICD-10-CM

## 2019-06-04 DIAGNOSIS — D22.9 MULTIPLE NEVI: ICD-10-CM

## 2019-06-04 DIAGNOSIS — R07.9 CHEST PAIN, UNSPECIFIED TYPE: ICD-10-CM

## 2019-06-04 DIAGNOSIS — N64.4 BREAST PAIN: ICD-10-CM

## 2019-06-04 DIAGNOSIS — R07.9 CHEST PAIN, UNSPECIFIED TYPE: Primary | ICD-10-CM

## 2019-06-04 PROCEDURE — 93005 ELECTROCARDIOGRAM TRACING: CPT | Performed by: FAMILY MEDICINE

## 2019-06-04 PROCEDURE — 93000 ELECTROCARDIOGRAM COMPLETE: CPT | Performed by: FAMILY MEDICINE

## 2019-06-04 PROCEDURE — 71046 X-RAY EXAM CHEST 2 VIEWS: CPT | Performed by: FAMILY MEDICINE

## 2019-06-04 PROCEDURE — 85379 FIBRIN DEGRADATION QUANT: CPT

## 2019-06-04 PROCEDURE — 36415 COLL VENOUS BLD VENIPUNCTURE: CPT

## 2019-06-04 PROCEDURE — 99212 OFFICE O/P EST SF 10 MIN: CPT | Performed by: FAMILY MEDICINE

## 2019-06-04 PROCEDURE — 85025 COMPLETE CBC W/AUTO DIFF WBC: CPT

## 2019-06-04 PROCEDURE — 99214 OFFICE O/P EST MOD 30 MIN: CPT | Performed by: FAMILY MEDICINE

## 2019-06-04 NOTE — PROGRESS NOTES
HPI:    Tiffanie Boothe is a 26year old female presents to clinic with a one week history of intermittent chest pain.    Patient states that symptoms started about 1 week back, she had both of her arms raised above her head brushing her hair and s mouth 2 (two) times daily with meals. Disp: 360 tablet Rfl: 0   GLIPIZIDE ER 2.5 MG Oral Tablet 24 Hr TAKE 1 TABLET (2.5 MG TOTAL) BY MOUTH DAILY WITH BREAKFAST. Disp: 90 tablet Rfl: 0   Cyclobenzaprine HCl 5 MG Oral Tab Take 1-2 PO Q HS.  Disp: 60 tablet R axis. No ST/T wave abnormalities. Normal study. CBC and DDimer drawn.  CXR ordered.     (N64.4) Breast pain  (Z80.3) Family history of breast cancer  Plan: [facility] (CPT=77066)       (D22.9) Multiple nevi  Plan: DERM - INTERNAL        Respon

## 2019-07-10 ENCOUNTER — OFFICE VISIT (OUTPATIENT)
Dept: ENDOCRINOLOGY CLINIC | Facility: CLINIC | Age: 34
End: 2019-07-10
Payer: COMMERCIAL

## 2019-07-10 VITALS
WEIGHT: 263.81 LBS | SYSTOLIC BLOOD PRESSURE: 138 MMHG | HEART RATE: 94 BPM | BODY MASS INDEX: 38 KG/M2 | DIASTOLIC BLOOD PRESSURE: 96 MMHG

## 2019-07-10 DIAGNOSIS — E11.65 TYPE 2 DIABETES MELLITUS WITH HYPERGLYCEMIA, WITHOUT LONG-TERM CURRENT USE OF INSULIN (HCC): Primary | ICD-10-CM

## 2019-07-10 LAB
CARTRIDGE LOT#: ABNORMAL NUMERIC
GLUCOSE BLOOD: 134
HEMOGLOBIN A1C: 7.6 % (ref 4.3–5.6)
TEST STRIP LOT #: NORMAL NUMERIC

## 2019-07-10 PROCEDURE — 36416 COLLJ CAPILLARY BLOOD SPEC: CPT | Performed by: INTERNAL MEDICINE

## 2019-07-10 PROCEDURE — 83036 HEMOGLOBIN GLYCOSYLATED A1C: CPT | Performed by: INTERNAL MEDICINE

## 2019-07-10 PROCEDURE — 99213 OFFICE O/P EST LOW 20 MIN: CPT | Performed by: INTERNAL MEDICINE

## 2019-07-10 PROCEDURE — 82962 GLUCOSE BLOOD TEST: CPT | Performed by: INTERNAL MEDICINE

## 2019-07-10 NOTE — PROGRESS NOTES
Return Office Visit     CHIEF COMPLAINT:  Type 2 DM        HISTORY OF PRESENT ILLNESS:  Sheryl Reyes is a 26year old female who presents for follow up for DM.      DM HISTORY  Diagnosed: 2015        HISTORY OF DIABETES COMPLICATIONS: :  History ALLERGY:    Amoxicillin             SWELLING  Dust                      Pollen                    Smoke                       PAST MEDICAL, SOCIAL AND FAMILY HISTORY:  See past medical history marked as reviewed.   See past surgical history marked as re A1c of 8.6 % --> 7.3 % ( 3/2018) --> 8.4 % --> 6.8 % --> 7.3 % ( 3/2019) --> 7.6 % ( 7/2019)     ASSESSMENT AND PLAN:     1. Type 2 DM:  a1c has gone up.      Plan:  Discussed the pathogenesis, natural course of diabetes.  Patient understands the importance

## 2019-07-15 RX ORDER — GLIPIZIDE 2.5 MG/1
5 TABLET, EXTENDED RELEASE ORAL
Qty: 90 TABLET | Refills: 0 | Status: SHIPPED | OUTPATIENT
Start: 2019-07-15 | End: 2019-09-26

## 2019-09-26 RX ORDER — GLIPIZIDE 2.5 MG/1
5 TABLET, EXTENDED RELEASE ORAL
Qty: 180 TABLET | Refills: 0 | Status: SHIPPED | OUTPATIENT
Start: 2019-09-26 | End: 2019-11-05

## 2019-11-05 ENCOUNTER — OFFICE VISIT (OUTPATIENT)
Dept: ENDOCRINOLOGY CLINIC | Facility: CLINIC | Age: 34
End: 2019-11-05
Payer: COMMERCIAL

## 2019-11-05 VITALS
BODY MASS INDEX: 37 KG/M2 | HEART RATE: 82 BPM | DIASTOLIC BLOOD PRESSURE: 96 MMHG | SYSTOLIC BLOOD PRESSURE: 138 MMHG | WEIGHT: 259.19 LBS

## 2019-11-05 DIAGNOSIS — L84 CALLUS: ICD-10-CM

## 2019-11-05 DIAGNOSIS — E11.65 TYPE 2 DIABETES MELLITUS WITH HYPERGLYCEMIA, WITHOUT LONG-TERM CURRENT USE OF INSULIN (HCC): Primary | ICD-10-CM

## 2019-11-05 PROCEDURE — 82962 GLUCOSE BLOOD TEST: CPT | Performed by: INTERNAL MEDICINE

## 2019-11-05 PROCEDURE — 99213 OFFICE O/P EST LOW 20 MIN: CPT | Performed by: INTERNAL MEDICINE

## 2019-11-05 PROCEDURE — 36416 COLLJ CAPILLARY BLOOD SPEC: CPT | Performed by: INTERNAL MEDICINE

## 2019-11-05 PROCEDURE — 83036 HEMOGLOBIN GLYCOSYLATED A1C: CPT | Performed by: INTERNAL MEDICINE

## 2019-11-05 RX ORDER — MULTIVIT WITH MINERALS/LUTEIN
1000 TABLET ORAL DAILY
COMMUNITY

## 2019-11-05 RX ORDER — GLIPIZIDE 2.5 MG/1
5 TABLET, EXTENDED RELEASE ORAL
Qty: 180 TABLET | Refills: 0 | Status: SHIPPED | OUTPATIENT
Start: 2019-11-05 | End: 2020-03-19

## 2019-11-05 NOTE — PROGRESS NOTES
Return Office Visit     CHIEF COMPLAINT:  Type 2 DM        HISTORY OF PRESENT ILLNESS:  Corazon Hall is a 37year old female who presents for follow up for DM.      DM HISTORY  Diagnosed: 2015        HISTORY OF DIABETES COMPLICATIONS: :  History Strip Check 2-3 times a day. 100 strip 0   • cetirizine 10 MG Oral Tab Take 10 mg by mouth daily.            ALLERGY:    Amoxicillin             SWELLING  Dust                      Pollen                    Smoke                       PAST MEDICAL, SOCIAL A no edema, normal monofilament sensation b/l, + dry LE b/l, + dry callus      DATA:     Labs reviewed      She had an A1c of 8.6 % --> 7.3 % ( 3/2018) --> 8.4 % --> 6.8 % --> 7.3 % ( 3/2019) --> 7.6 % ( 7/2019) --> 7.2 % ( 11/2019)     ASSESSMENT AND PLAN:

## 2019-12-05 ENCOUNTER — NURSE TRIAGE (OUTPATIENT)
Dept: FAMILY MEDICINE CLINIC | Facility: CLINIC | Age: 34
End: 2019-12-05

## 2019-12-05 NOTE — TELEPHONE ENCOUNTER
Action Requested: Summary for Provider     []  Critical Lab, Recommendations Needed  [] Need Additional Advice  []   FYI    []   Need Orders  [] Need Medications Sent to Pharmacy  []  Other     SUMMARY: Per protocol advised OV/UCC/WIC    Reason for call: C

## 2020-01-23 ENCOUNTER — PATIENT MESSAGE (OUTPATIENT)
Dept: FAMILY MEDICINE CLINIC | Facility: CLINIC | Age: 35
End: 2020-01-23

## 2020-01-23 ENCOUNTER — NURSE TRIAGE (OUTPATIENT)
Dept: FAMILY MEDICINE CLINIC | Facility: CLINIC | Age: 35
End: 2020-01-23

## 2020-01-23 NOTE — TELEPHONE ENCOUNTER
----- Message from Dana Mccabe sent at 1/23/2020 11:15 AM CST -----  Regarding: Non-Urgent Medical Question  Contact: [phone]  Hi,    For the past few days I have been pretty queasy. Along with body aches, migraines, cough, and sniffles.

## 2020-01-23 NOTE — TELEPHONE ENCOUNTER
Attempted to call patient to triage regarding symptomatic MyChart message. LMTCB. Please transfer to Triage.

## 2020-01-23 NOTE — TELEPHONE ENCOUNTER
Action Requested: Summary for Provider     []  Critical Lab, Recommendations Needed  [] Need Additional Advice  []   FYI    []   Need Orders  [] Need Medications Sent to Pharmacy  []  Other     SUMMARY: Patient calling with productive cough for almost two

## 2020-01-23 NOTE — TELEPHONE ENCOUNTER
From: Govind Pagan  To: Lena Wen MD  Sent: 1/23/2020 11:15 AM CST  Subject: Non-Urgent Medical Question    Hi,    For the past few days I have been pretty queasy. Along with body aches, migraines, cough, and sniffles.      Just wondering if

## 2020-01-24 RX ORDER — AZITHROMYCIN 250 MG/1
TABLET, FILM COATED ORAL
Qty: 6 TABLET | Refills: 0 | Status: SHIPPED | OUTPATIENT
Start: 2020-01-24 | End: 2020-01-24

## 2020-01-24 RX ORDER — AZITHROMYCIN 250 MG/1
TABLET, FILM COATED ORAL
Qty: 6 TABLET | Refills: 0 | Status: SHIPPED | OUTPATIENT
Start: 2020-01-24 | End: 2020-09-02 | Stop reason: ALTCHOICE

## 2020-01-24 NOTE — TELEPHONE ENCOUNTER
Advised patient of Dr. Altamirano Anger note. Patient verbalized understanding  Patient states she needs azithromycin sent  to [facility] in [facility] not [facility]. Script sent to [facility] in [facility]. Cancelled script at [facility] in [facility].      Patient state

## 2020-01-25 NOTE — TELEPHONE ENCOUNTER
Pt has viewed my chart message.         From  Rebecca Magaña RN To  Oj Lam and Delivered  1/24/2020  1:36 PM   Last Read in [employer]  1/24/2020  7:06 PM by Ludy Bhatt,     Dr Beverly Limon answered your quest

## 2020-01-28 ENCOUNTER — OFFICE VISIT (OUTPATIENT)
Dept: FAMILY MEDICINE CLINIC | Facility: CLINIC | Age: 35
End: 2020-01-28
Payer: COMMERCIAL

## 2020-01-28 VITALS
HEART RATE: 97 BPM | SYSTOLIC BLOOD PRESSURE: 124 MMHG | TEMPERATURE: 99 F | BODY MASS INDEX: 36 KG/M2 | WEIGHT: 254 LBS | DIASTOLIC BLOOD PRESSURE: 87 MMHG | RESPIRATION RATE: 18 BRPM | OXYGEN SATURATION: 97 %

## 2020-01-28 DIAGNOSIS — H92.02 LEFT EAR PAIN: ICD-10-CM

## 2020-01-28 DIAGNOSIS — R09.89 CHEST CONGESTION: Primary | ICD-10-CM

## 2020-01-28 PROCEDURE — 90686 IIV4 VACC NO PRSV 0.5 ML IM: CPT | Performed by: FAMILY MEDICINE

## 2020-01-28 PROCEDURE — 99213 OFFICE O/P EST LOW 20 MIN: CPT | Performed by: FAMILY MEDICINE

## 2020-01-28 PROCEDURE — 90471 IMMUNIZATION ADMIN: CPT | Performed by: FAMILY MEDICINE

## 2020-01-28 NOTE — PROGRESS NOTES
HPI:    Brooklynn Colbert is a 37year old female presents to clinic for follow-up. For the past 3 weeks patient has been experiencing nasal congestion and a productive cough. Reports occasional chills, fatigue, and depressed appetite.   Started a Nare route daily. 3 Inhaler 1   • loratadine 10 MG Oral Tab Take 10 mg by mouth daily. • Ascorbic Acid (VITAMIN C) 1000 MG Oral Tab Take 1,000 mg by mouth daily.      • ONETOUCH DELICA LANCETS FINE Does not apply Misc Check 2-3 times a day 100 each 2 barriers to learning observed.            Orders This Visit:  Orders Placed This Encounter      Flulaval 6 months and older 0.5 ml Quad PF [id number]      Meds This Visit:  Requested Prescriptions      No prescriptions requested or ordered in this encounter

## 2020-01-28 NOTE — TELEPHONE ENCOUNTER
Patient indicated that started feeling worse since last night. Still coughing. Has left ear pain and sore throat. Waking her up at night. Patient wanted to be seen today. Appointment made for today at 11:30am with Dr Parisa Miller in [facility].  Appointment for william

## 2020-02-03 RX ORDER — SITAGLIPTIN 100 MG/1
TABLET, FILM COATED ORAL
Qty: 90 TABLET | Refills: 0 | Status: SHIPPED | OUTPATIENT
Start: 2020-02-03 | End: 2020-04-29

## 2020-03-12 ENCOUNTER — NURSE TRIAGE (OUTPATIENT)
Dept: OTHER | Age: 35
End: 2020-03-12

## 2020-03-12 NOTE — TELEPHONE ENCOUNTER
Action Requested: Summary for Provider     []  Critical Lab, Recommendations Needed  [] Need Additional Advice  []   FYI    []   Need Orders  [] Need Medications Sent to Pharmacy  []  Other     SUMMARY: Patient states she is calling to [facility] just in case

## 2020-03-15 RX ORDER — ALBUTEROL SULFATE 90 UG/1
AEROSOL, METERED RESPIRATORY (INHALATION)
Qty: 25.5 INHALER | Refills: 1 | Status: SHIPPED | OUTPATIENT
Start: 2020-03-15

## 2020-03-16 NOTE — TELEPHONE ENCOUNTER
Refill passed per [facility] protocol.       Requested Prescriptions   Pending Prescriptions Disp Refills   • ALBUTEROL SULFATE  (90 Base) MCG/ACT Inhalation Aero Soln [Pharmacy Med Name: ALBUTEROL HFA (PROAIR) INHALER] 25.5 Inhaler 0     Sig: T

## 2020-03-19 ENCOUNTER — TELEPHONE (OUTPATIENT)
Dept: ENDOCRINOLOGY CLINIC | Facility: CLINIC | Age: 35
End: 2020-03-19

## 2020-03-19 RX ORDER — GLIPIZIDE 2.5 MG/1
5 TABLET, EXTENDED RELEASE ORAL
Qty: 180 TABLET | Refills: 0 | Status: SHIPPED | OUTPATIENT
Start: 2020-03-19 | End: 2020-06-08

## 2020-03-19 NOTE — TELEPHONE ENCOUNTER
Chart reviewed.   Refilled per protocol  Future Appointments   Date Time Provider Rachana Hayden   3/23/2020  2:00 PM Liliana Calderón MD [facility]

## 2020-03-23 ENCOUNTER — TELEPHONE (OUTPATIENT)
Dept: ENDOCRINOLOGY CLINIC | Facility: CLINIC | Age: 35
End: 2020-03-23

## 2020-03-23 DIAGNOSIS — E11.69 TYPE 2 DIABETES MELLITUS WITH OTHER SPECIFIED COMPLICATION, WITHOUT LONG-TERM CURRENT USE OF INSULIN (HCC): Primary | ICD-10-CM

## 2020-03-23 PROCEDURE — 99442 PHONE E/M BY PHYS 11-20 MIN: CPT | Performed by: INTERNAL MEDICINE

## 2020-03-23 NOTE — TELEPHONE ENCOUNTER
Virtual/Telephone Check-In    [place] verbally consents to a [employer] on 03/23/20.   Patient understands and accepts financial responsibility for any deductible, co-insurance and/or co-pays associated with this servi annual eye exams, she will schedule one once the public health concern resolves.   Daily foot exam discussed  Labs as below      Labs: a1c, fasting lipid panel, Urine MA ( to be done at a later time when safe to go out given public health concerns or on nex

## 2020-04-10 RX ORDER — GLIPIZIDE 5 MG/1
5 TABLET, FILM COATED, EXTENDED RELEASE ORAL
Qty: 90 TABLET | Refills: 1 | OUTPATIENT
Start: 2020-04-10

## 2020-04-10 NOTE — TELEPHONE ENCOUNTER
Refill for glipizide (90 day supply) was sent by Dr. Lorenzo Pruett on 3/19. Called pharmacy and the confirmed that it was received and patient already picked up prescription on 3/20. Will refuse this request as it was sent by error.      Thank you

## 2020-04-29 RX ORDER — SITAGLIPTIN 100 MG/1
TABLET, FILM COATED ORAL
Qty: 90 TABLET | Refills: 0 | Status: SHIPPED | OUTPATIENT
Start: 2020-04-29 | End: 2020-07-19

## 2020-04-30 ENCOUNTER — TELEMEDICINE (OUTPATIENT)
Dept: FAMILY MEDICINE CLINIC | Facility: CLINIC | Age: 35
End: 2020-04-30

## 2020-04-30 ENCOUNTER — NURSE TRIAGE (OUTPATIENT)
Dept: FAMILY MEDICINE CLINIC | Facility: CLINIC | Age: 35
End: 2020-04-30

## 2020-04-30 DIAGNOSIS — R05.9 COUGH: Primary | ICD-10-CM

## 2020-04-30 DIAGNOSIS — J02.9 SORE THROAT: ICD-10-CM

## 2020-04-30 DIAGNOSIS — R09.81 CONGESTED NOSE: ICD-10-CM

## 2020-04-30 PROCEDURE — 99213 OFFICE O/P EST LOW 20 MIN: CPT | Performed by: FAMILY MEDICINE

## 2020-04-30 RX ORDER — AZELASTINE 1 MG/ML
2 SPRAY, METERED NASAL 2 TIMES DAILY
Qty: 1 BOTTLE | Refills: 3 | Status: SHIPPED | OUTPATIENT
Start: 2020-04-30 | End: 2020-09-28

## 2020-04-30 NOTE — PROGRESS NOTES
HPI:    Patient ID: Scott Crawford is a 37year old female. Cough   This is a new problem. The current episode started yesterday. The problem has been gradually worsening. The cough is non-productive.  Associated symptoms include nasal congestion PHYSICAL EXAM:   Physical Exam   Constitutional: She is oriented to person, place, and time. She appears well-developed and well-nourished. Pulmonary/Chest: Effort normal. No respiratory distress.    Neurological: She is alert and oriented to person, p

## 2020-04-30 NOTE — TELEPHONE ENCOUNTER
Action Requested: Summary for Provider     []  Critical Lab, Recommendations Needed  [] Need Additional Advice  []   FYI    []   Need Orders  [] Need Medications Sent to Pharmacy  []  Other     SUMMARY: per patient she has been having a cough for the past

## 2020-06-05 ENCOUNTER — TELEPHONE (OUTPATIENT)
Dept: ENDOCRINOLOGY CLINIC | Facility: CLINIC | Age: 35
End: 2020-06-05

## 2020-06-05 NOTE — TELEPHONE ENCOUNTER
Pt does not feel comfortable coming in to get her labs drawn yet due to pandemic. She does have appointment scheduled on 6/8/20. Please advise.

## 2020-06-05 NOTE — TELEPHONE ENCOUNTER
Patient wanted to advise that she does not feel comfortable coming in to complete her labs yet.  Please advise

## 2020-06-08 ENCOUNTER — TELEMEDICINE (OUTPATIENT)
Dept: ENDOCRINOLOGY CLINIC | Facility: CLINIC | Age: 35
End: 2020-06-08

## 2020-06-08 DIAGNOSIS — E11.65 TYPE 2 DIABETES MELLITUS WITH HYPERGLYCEMIA, WITHOUT LONG-TERM CURRENT USE OF INSULIN (HCC): Primary | ICD-10-CM

## 2020-06-08 DIAGNOSIS — E78.5 DYSLIPIDEMIA: ICD-10-CM

## 2020-06-08 PROCEDURE — 99213 OFFICE O/P EST LOW 20 MIN: CPT | Performed by: INTERNAL MEDICINE

## 2020-06-08 RX ORDER — GLIPIZIDE 2.5 MG/1
7.5 TABLET, EXTENDED RELEASE ORAL
Qty: 270 TABLET | Refills: 0 | Status: SHIPPED | OUTPATIENT
Start: 2020-06-08 | End: 2020-09-02

## 2020-06-08 NOTE — PROGRESS NOTES
Lore Molina verbally consents to a video visit on 6/8/2020     Patient understands and accepts financial responsibility for any deductible, co-insurance and/or co-pays associated with this service.   Patient has been referred to the [facility] website Oral Tablet 24 Hr Take 2 tablets (5 mg total) by mouth daily with breakfast. 180 tablet 0   • ALBUTEROL SULFATE  (90 Base) MCG/ACT Inhalation Aero Soln TAKE 2 PUFFS BY MOUTH EVERY 4 HOURS AS NEEDED FOR WHEEZE 25.5 Inhaler 1   • azithromycin 250 MG O appearing  HENT: [place]: atraumatic  EYE: no scleral icterusNo eye discharge  Pulmonary:  Speaking in full sentences  , pulmonary effort is normal  Neurological: Alert and oriented to person, place and time  Psychiatric: Behavior is normal, normal affect  S She is not comfortable going out at this time and will like to repeat labs in three months when she comes in for a FU  She will update me with her BG as discussed  FU booked  Call with sugars as discussed.      Patient verbalized a complete  understand

## 2020-06-12 ENCOUNTER — TELEPHONE (OUTPATIENT)
Dept: FAMILY MEDICINE CLINIC | Facility: CLINIC | Age: 35
End: 2020-06-12

## 2020-06-12 ENCOUNTER — PATIENT MESSAGE (OUTPATIENT)
Dept: FAMILY MEDICINE CLINIC | Facility: CLINIC | Age: 35
End: 2020-06-12

## 2020-06-12 RX ORDER — MONTELUKAST SODIUM 10 MG/1
10 TABLET ORAL NIGHTLY
Qty: 90 TABLET | Refills: 1 | Status: SHIPPED | OUTPATIENT
Start: 2020-06-12 | End: 2020-12-11

## 2020-06-12 NOTE — TELEPHONE ENCOUNTER
Patient had a telemed visit with  on 4/30/20, with history of environmental allergies, non- productive cough, nasal congestion and sore throat.        Left message to call back, transfer to triage

## 2020-06-12 NOTE — TELEPHONE ENCOUNTER
Lan Corea was here today for her PAT appointment. Health assessment was completed and instructions given regarding NPO status, medications, Hibiclens washes, and removal of all jewelry and/or body piercing. Opportunity was given to ask questions and all questions were answered. Understanding of instructions was verbalized. Patient returning call. Per patient she is taking zyrtec otc but would like to try taking montalukast. Per patient she was prescribed that before but never took it.    Patient requested telemed visit, set up for 6/15/20 at 1:45    Refill passed per [place]

## 2020-06-12 NOTE — TELEPHONE ENCOUNTER
From: Doeren Middleton  To: Jannie Lundberg MD  Sent: 6/12/2020 3:08 PM CDT  Subject: Non-Urgent Medical Question    Hi,    Sorry to bug when your ready for the weekend!  My allergies are super bad this season and I was hoping that you can give me a fe

## 2020-06-12 NOTE — TELEPHONE ENCOUNTER
Advised to call Triage    ----- Message from Cas Haq sent at 6/12/2020  3:08 PM CDT -----  Regarding: Non-Urgent Medical Question  Contact: [phone]  Hi,    Sorry to bug when your ready for the weekend!  My allergies are super bad this se

## 2020-06-15 ENCOUNTER — VIRTUAL PHONE E/M (OUTPATIENT)
Dept: FAMILY MEDICINE CLINIC | Facility: CLINIC | Age: 35
End: 2020-06-15
Payer: COMMERCIAL

## 2020-06-15 DIAGNOSIS — M79.7 FIBROMYALGIA: ICD-10-CM

## 2020-06-15 DIAGNOSIS — J30.2 SEASONAL ALLERGIC RHINITIS, UNSPECIFIED TRIGGER: Primary | ICD-10-CM

## 2020-06-15 DIAGNOSIS — K58.9 IRRITABLE BOWEL SYNDROME, UNSPECIFIED TYPE: ICD-10-CM

## 2020-06-15 PROCEDURE — 99213 OFFICE O/P EST LOW 20 MIN: CPT | Performed by: FAMILY MEDICINE

## 2020-06-15 NOTE — PROGRESS NOTES
Virtual Telephone Check-In    Scott Crawford verbally consents to a Virtual/Telephone Check-In visit on 06/15/20.     Patient understands and accepts financial responsibility for any deductible, co-insurance and/or co-pays associated with this servi restrictions of visitation. There are limitations of this visit as no physical exam could be performed. Every conscious effort was taken to allow for sufficient and adequate time.   This billing was spent on reviewing labs, medications, radiology tests an

## 2020-07-02 ENCOUNTER — TELEPHONE (OUTPATIENT)
Dept: OTOLARYNGOLOGY | Facility: CLINIC | Age: 35
End: 2020-07-02

## 2020-07-02 DIAGNOSIS — J34.89 NASAL DISCHARGE: Primary | ICD-10-CM

## 2020-07-02 DIAGNOSIS — R05.9 COUGH: ICD-10-CM

## 2020-07-02 PROCEDURE — 99203 OFFICE O/P NEW LOW 30 MIN: CPT | Performed by: OTOLARYNGOLOGY

## 2020-07-02 RX ORDER — METHSCOPOLAMINE BROMIDE 2.5 MG/1
2.5 TABLET ORAL 2 TIMES DAILY
Qty: 60 TABLET | Refills: 3 | Status: SHIPPED | OUTPATIENT
Start: 2020-07-02 | End: 2021-10-25

## 2020-07-02 RX ORDER — PSEUDOEPHEDRINE HCL 120 MG/1
120 TABLET, FILM COATED, EXTENDED RELEASE ORAL EVERY 12 HOURS
Qty: 60 TABLET | Refills: 3 | Status: SHIPPED | OUTPATIENT
Start: 2020-07-02 | End: 2020-09-02

## 2020-07-02 NOTE — TELEPHONE ENCOUNTER
Virtual Telephone Check-In    Codie Rossi verbally consents to a Virtual/Telephone Check-In visit on 07/02/20. Patient has been referred to the [facility] website at [email or web address] to review the yearly Consent to Treat document.     Patient • Diabetes ([facility] [address]    • Fibromyalgia    • Fibromyalgia    • IBS (irritable bowel syndrome)        Past Surgical History:   Procedure Laterality Date   • CHOLECYSTECTOMY           REVIEW OF SYSTEMS    System Neg/Pos Details   Constitutional Negative Fatigu Nare route daily. , Disp: 3 Inhaler, Rfl: 1  •  loratadine 10 MG Oral Tab, Take 10 mg by mouth daily. , Disp: , Rfl:   •  ONETOUCH DELICA LANCETS FINE Does not apply Misc, Check 2-3 times a day, Disp: 100 each, Rfl: 2  •  Glucose Blood (ONETOUCH VERIO) In Vi

## 2020-07-19 RX ORDER — SITAGLIPTIN 100 MG/1
TABLET, FILM COATED ORAL
Qty: 90 TABLET | Refills: 0 | Status: SHIPPED | OUTPATIENT
Start: 2020-07-19 | End: 2020-10-23

## 2020-09-02 ENCOUNTER — OFFICE VISIT (OUTPATIENT)
Dept: ENDOCRINOLOGY CLINIC | Facility: CLINIC | Age: 35
End: 2020-09-02
Payer: COMMERCIAL

## 2020-09-02 ENCOUNTER — LAB ENCOUNTER (OUTPATIENT)
Dept: LAB | Facility: HOSPITAL | Age: 35
End: 2020-09-02
Attending: INTERNAL MEDICINE
Payer: COMMERCIAL

## 2020-09-02 ENCOUNTER — PATIENT MESSAGE (OUTPATIENT)
Dept: ENDOCRINOLOGY CLINIC | Facility: CLINIC | Age: 35
End: 2020-09-02

## 2020-09-02 VITALS
SYSTOLIC BLOOD PRESSURE: 124 MMHG | HEIGHT: 70 IN | RESPIRATION RATE: 20 BRPM | DIASTOLIC BLOOD PRESSURE: 88 MMHG | WEIGHT: 266.81 LBS | HEART RATE: 92 BPM | BODY MASS INDEX: 38.2 KG/M2

## 2020-09-02 DIAGNOSIS — E78.5 DYSLIPIDEMIA: ICD-10-CM

## 2020-09-02 DIAGNOSIS — E11.9 TYPE 2 DIABETES MELLITUS WITHOUT COMPLICATION, WITHOUT LONG-TERM CURRENT USE OF INSULIN (HCC): ICD-10-CM

## 2020-09-02 DIAGNOSIS — E11.69 TYPE 2 DIABETES MELLITUS WITH OTHER SPECIFIED COMPLICATION, WITHOUT LONG-TERM CURRENT USE OF INSULIN (HCC): ICD-10-CM

## 2020-09-02 DIAGNOSIS — E11.9 TYPE 2 DIABETES MELLITUS WITHOUT COMPLICATION, WITHOUT LONG-TERM CURRENT USE OF INSULIN (HCC): Primary | ICD-10-CM

## 2020-09-02 LAB
ALBUMIN SERPL-MCNC: 4.2 G/DL (ref 3.4–5)
ALBUMIN/GLOB SERPL: 1.2 {RATIO} (ref 1–2)
ALP LIVER SERPL-CCNC: 63 U/L (ref 37–98)
ALT SERPL-CCNC: 56 U/L (ref 13–56)
ANION GAP SERPL CALC-SCNC: 7 MMOL/L (ref 0–18)
AST SERPL-CCNC: 27 U/L (ref 15–37)
BILIRUB SERPL-MCNC: 0.4 MG/DL (ref 0.1–2)
BUN BLD-MCNC: 10 MG/DL (ref 7–18)
BUN/CREAT SERPL: 14.9 (ref 10–20)
CALCIUM BLD-MCNC: 9.5 MG/DL (ref 8.5–10.1)
CARTRIDGE LOT#: 671 NUMERIC
CHLORIDE SERPL-SCNC: 105 MMOL/L (ref 98–112)
CO2 SERPL-SCNC: 27 MMOL/L (ref 21–32)
CREAT BLD-MCNC: 0.67 MG/DL (ref 0.55–1.02)
CREAT UR-SCNC: 72.9 MG/DL
GLOBULIN PLAS-MCNC: 3.5 G/DL (ref 2.8–4.4)
GLUCOSE BLD-MCNC: 237 MG/DL (ref 70–99)
GLUCOSE BLOOD: 255
HEMOGLOBIN A1C: 8.4 % (ref 4.3–5.6)
LDLC SERPL DIRECT ASSAY-MCNC: 92 MG/DL (ref ?–100)
M PROTEIN MFR SERPL ELPH: 7.7 G/DL (ref 6.4–8.2)
MICROALBUMIN UR-MCNC: 1.06 MG/DL
MICROALBUMIN/CREAT 24H UR-RTO: 14.5 UG/MG (ref ?–30)
OSMOLALITY SERPL CALC.SUM OF ELEC: 295 MOSM/KG (ref 275–295)
PATIENT FASTING Y/N/NP: NO
POTASSIUM SERPL-SCNC: 4.5 MMOL/L (ref 3.5–5.1)
SODIUM SERPL-SCNC: 139 MMOL/L (ref 136–145)
TEST STRIP LOT #: ABNORMAL NUMERIC

## 2020-09-02 PROCEDURE — 82962 GLUCOSE BLOOD TEST: CPT | Performed by: INTERNAL MEDICINE

## 2020-09-02 PROCEDURE — 83721 ASSAY OF BLOOD LIPOPROTEIN: CPT

## 2020-09-02 PROCEDURE — 99213 OFFICE O/P EST LOW 20 MIN: CPT | Performed by: INTERNAL MEDICINE

## 2020-09-02 PROCEDURE — 3008F BODY MASS INDEX DOCD: CPT | Performed by: INTERNAL MEDICINE

## 2020-09-02 PROCEDURE — 3079F DIAST BP 80-89 MM HG: CPT | Performed by: INTERNAL MEDICINE

## 2020-09-02 PROCEDURE — 83036 HEMOGLOBIN GLYCOSYLATED A1C: CPT | Performed by: INTERNAL MEDICINE

## 2020-09-02 PROCEDURE — 36415 COLL VENOUS BLD VENIPUNCTURE: CPT

## 2020-09-02 PROCEDURE — 82570 ASSAY OF URINE CREATININE: CPT

## 2020-09-02 PROCEDURE — 36416 COLLJ CAPILLARY BLOOD SPEC: CPT | Performed by: INTERNAL MEDICINE

## 2020-09-02 PROCEDURE — 82043 UR ALBUMIN QUANTITATIVE: CPT

## 2020-09-02 PROCEDURE — 80053 COMPREHEN METABOLIC PANEL: CPT

## 2020-09-02 PROCEDURE — 3074F SYST BP LT 130 MM HG: CPT | Performed by: INTERNAL MEDICINE

## 2020-09-02 RX ORDER — CETIRIZINE HYDROCHLORIDE 10 MG/1
TABLET ORAL
COMMUNITY
Start: 2020-04-01

## 2020-09-02 RX ORDER — GLIPIZIDE 2.5 MG/1
7.5 TABLET, EXTENDED RELEASE ORAL
Qty: 270 TABLET | Refills: 1 | Status: CANCELLED | OUTPATIENT
Start: 2020-09-02

## 2020-09-02 RX ORDER — SERTRALINE HYDROCHLORIDE 25 MG/1
50 TABLET, FILM COATED ORAL DAILY
COMMUNITY
Start: 2020-08-05 | End: 2021-09-16 | Stop reason: DRUGHIGH

## 2020-09-02 RX ORDER — GLIPIZIDE 10 MG/1
10 TABLET, FILM COATED, EXTENDED RELEASE ORAL DAILY
Qty: 90 TABLET | Refills: 0 | Status: SHIPPED | OUTPATIENT
Start: 2020-09-02 | End: 2020-11-23

## 2020-09-02 RX ORDER — GLIPIZIDE 2.5 MG/1
7.5 TABLET, EXTENDED RELEASE ORAL
Qty: 270 TABLET | Refills: 0 | Status: CANCELLED | OUTPATIENT
Start: 2020-09-02

## 2020-09-02 NOTE — PROGRESS NOTES
Return Office Visit     CHIEF COMPLAINT:  Type 2 DM        HISTORY OF PRESENT ILLNESS:  Edelmira Edmond is a 37year old female who presents for follow up for DM.      DM HISTORY  Diagnosed: 2015        HISTORY OF DIABETES COMPLICATIONS: :  History 1,000 mg by mouth daily. • Fluticasone Propionate 50 MCG/ACT Nasal Suspension 1 spray by Each Nare route daily. 3 Inhaler 1   • loratadine 10 MG Oral Tab Take 10 mg by mouth daily.      • ONETOUCH DELICA LANCETS FINE Does not apply Misc Check 2-3 times ptosis, conjunctiva normal  ENT:  Normocephalic, atraumatic  NECK:  Supple, symmetrical, trachea midline, no adenopathy, thyroid symmetric, not enlarged and no tenderness  LUNGS: no wheezing  CARDIOVASCULAR:  regular rate a  ABDOMEN:  soft  SKIN:  no bruis including reductions in dietary total and saturated fat, weight loss, aerobic exercise, and eating a diet rich in fruits and vegetables. 4. Obesity  resume low calorie diet  Resume exercise    Patient is not planning to get pregnant.  Understands that A1c

## 2020-09-28 RX ORDER — AZELASTINE 1 MG/ML
2 SPRAY, METERED NASAL 2 TIMES DAILY
Qty: 1 BOTTLE | Refills: 3 | Status: SHIPPED | OUTPATIENT
Start: 2020-09-28 | End: 2021-09-16

## 2020-10-23 RX ORDER — SITAGLIPTIN 100 MG/1
TABLET, FILM COATED ORAL
Qty: 90 TABLET | Refills: 0 | Status: SHIPPED | OUTPATIENT
Start: 2020-10-23 | End: 2021-09-16

## 2020-11-23 RX ORDER — GLIPIZIDE 10 MG/1
TABLET, FILM COATED, EXTENDED RELEASE ORAL
Qty: 90 TABLET | Refills: 0 | Status: SHIPPED | OUTPATIENT
Start: 2020-11-23 | End: 2021-10-07

## 2020-12-11 RX ORDER — MONTELUKAST SODIUM 10 MG/1
TABLET ORAL
Qty: 90 TABLET | Refills: 1 | Status: SHIPPED | OUTPATIENT
Start: 2020-12-11 | End: 2021-10-25

## 2021-09-16 ENCOUNTER — PATIENT MESSAGE (OUTPATIENT)
Dept: FAMILY MEDICINE CLINIC | Facility: CLINIC | Age: 36
End: 2021-09-16

## 2021-09-16 ENCOUNTER — OFFICE VISIT (OUTPATIENT)
Dept: FAMILY MEDICINE CLINIC | Facility: CLINIC | Age: 36
End: 2021-09-16
Payer: COMMERCIAL

## 2021-09-16 ENCOUNTER — LAB ENCOUNTER (OUTPATIENT)
Dept: LAB | Age: 36
End: 2021-09-16
Attending: FAMILY MEDICINE
Payer: COMMERCIAL

## 2021-09-16 VITALS
DIASTOLIC BLOOD PRESSURE: 84 MMHG | BODY MASS INDEX: 37.24 KG/M2 | HEART RATE: 97 BPM | TEMPERATURE: 98 F | HEIGHT: 70 IN | SYSTOLIC BLOOD PRESSURE: 126 MMHG | WEIGHT: 260.13 LBS

## 2021-09-16 DIAGNOSIS — F41.1 GENERALIZED ANXIETY DISORDER: ICD-10-CM

## 2021-09-16 DIAGNOSIS — Z00.00 ANNUAL PHYSICAL EXAM: Primary | ICD-10-CM

## 2021-09-16 DIAGNOSIS — E11.9 TYPE 2 DIABETES MELLITUS WITHOUT COMPLICATION, WITHOUT LONG-TERM CURRENT USE OF INSULIN (HCC): Primary | ICD-10-CM

## 2021-09-16 DIAGNOSIS — E11.9 TYPE 2 DIABETES MELLITUS WITHOUT COMPLICATION, WITHOUT LONG-TERM CURRENT USE OF INSULIN (HCC): ICD-10-CM

## 2021-09-16 DIAGNOSIS — Z23 FLU VACCINE NEED: ICD-10-CM

## 2021-09-16 DIAGNOSIS — F33.1 MAJOR DEPRESSIVE DISORDER, RECURRENT EPISODE, MODERATE (HCC): ICD-10-CM

## 2021-09-16 DIAGNOSIS — Z00.00 ANNUAL PHYSICAL EXAM: ICD-10-CM

## 2021-09-16 LAB
ALBUMIN SERPL-MCNC: 4.5 G/DL (ref 3.4–5)
ALBUMIN/GLOB SERPL: 1.3 {RATIO} (ref 1–2)
ALP LIVER SERPL-CCNC: 73 U/L
ALT SERPL-CCNC: 63 U/L
ANION GAP SERPL CALC-SCNC: 9 MMOL/L (ref 0–18)
AST SERPL-CCNC: 31 U/L (ref 15–37)
BASOPHILS # BLD AUTO: 0.07 X10(3) UL (ref 0–0.2)
BASOPHILS NFR BLD AUTO: 0.9 %
BILIRUB SERPL-MCNC: 0.5 MG/DL (ref 0.1–2)
BUN BLD-MCNC: 10 MG/DL (ref 7–18)
BUN/CREAT SERPL: 21.7 (ref 10–20)
CALCIUM BLD-MCNC: 9.5 MG/DL (ref 8.5–10.1)
CHLORIDE SERPL-SCNC: 103 MMOL/L (ref 98–112)
CHOLEST SERPL-MCNC: 185 MG/DL (ref ?–200)
CO2 SERPL-SCNC: 25 MMOL/L (ref 21–32)
CREAT BLD-MCNC: 0.46 MG/DL
CREAT UR-SCNC: 204 MG/DL
DEPRECATED RDW RBC AUTO: 42.3 FL (ref 35.1–46.3)
EOSINOPHIL # BLD AUTO: 0.12 X10(3) UL (ref 0–0.7)
EOSINOPHIL NFR BLD AUTO: 1.6 %
ERYTHROCYTE [DISTWIDTH] IN BLOOD BY AUTOMATED COUNT: 12.9 % (ref 11–15)
EST. AVERAGE GLUCOSE BLD GHB EST-MCNC: 249 MG/DL (ref 68–126)
GLOBULIN PLAS-MCNC: 3.6 G/DL (ref 2.8–4.4)
GLUCOSE BLD-MCNC: 113 MG/DL (ref 70–99)
HBA1C MFR BLD HPLC: 10.3 % (ref ?–5.7)
HCT VFR BLD AUTO: 44.1 %
HDLC SERPL-MCNC: 50 MG/DL (ref 40–59)
HGB BLD-MCNC: 14 G/DL
IMM GRANULOCYTES # BLD AUTO: 0.06 X10(3) UL (ref 0–1)
IMM GRANULOCYTES NFR BLD: 0.8 %
LDLC SERPL CALC-MCNC: 104 MG/DL (ref ?–100)
LYMPHOCYTES # BLD AUTO: 3.04 X10(3) UL (ref 1–4)
LYMPHOCYTES NFR BLD AUTO: 40.4 %
MCH RBC QN AUTO: 28.7 PG (ref 26–34)
MCHC RBC AUTO-ENTMCNC: 31.7 G/DL (ref 31–37)
MCV RBC AUTO: 90.4 FL
MICROALBUMIN UR-MCNC: 8.43 MG/DL
MICROALBUMIN/CREAT 24H UR-RTO: 41.3 UG/MG (ref ?–30)
MONOCYTES # BLD AUTO: 0.49 X10(3) UL (ref 0.1–1)
MONOCYTES NFR BLD AUTO: 6.5 %
NEUTROPHILS # BLD AUTO: 3.74 X10 (3) UL (ref 1.5–7.7)
NEUTROPHILS # BLD AUTO: 3.74 X10(3) UL (ref 1.5–7.7)
NEUTROPHILS NFR BLD AUTO: 49.8 %
NONHDLC SERPL-MCNC: 135 MG/DL (ref ?–130)
OSMOLALITY SERPL CALC.SUM OF ELEC: 284 MOSM/KG (ref 275–295)
PATIENT FASTING Y/N/NP: NO
PATIENT FASTING Y/N/NP: NO
PLATELET # BLD AUTO: 287 10(3)UL (ref 150–450)
POTASSIUM SERPL-SCNC: 3.9 MMOL/L (ref 3.5–5.1)
PROT SERPL-MCNC: 8.1 G/DL (ref 6.4–8.2)
RBC # BLD AUTO: 4.88 X10(6)UL
SODIUM SERPL-SCNC: 137 MMOL/L (ref 136–145)
TRIGL SERPL-MCNC: 178 MG/DL (ref 30–149)
TSI SER-ACNC: 1.27 MIU/ML (ref 0.36–3.74)
VLDLC SERPL CALC-MCNC: 30 MG/DL (ref 0–30)
WBC # BLD AUTO: 7.5 X10(3) UL (ref 4–11)

## 2021-09-16 PROCEDURE — 3008F BODY MASS INDEX DOCD: CPT | Performed by: FAMILY MEDICINE

## 2021-09-16 PROCEDURE — 80053 COMPREHEN METABOLIC PANEL: CPT

## 2021-09-16 PROCEDURE — 82570 ASSAY OF URINE CREATININE: CPT

## 2021-09-16 PROCEDURE — 3060F POS MICROALBUMINURIA REV: CPT | Performed by: FAMILY MEDICINE

## 2021-09-16 PROCEDURE — 36415 COLL VENOUS BLD VENIPUNCTURE: CPT

## 2021-09-16 PROCEDURE — 84443 ASSAY THYROID STIM HORMONE: CPT

## 2021-09-16 PROCEDURE — 3079F DIAST BP 80-89 MM HG: CPT | Performed by: FAMILY MEDICINE

## 2021-09-16 PROCEDURE — 3074F SYST BP LT 130 MM HG: CPT | Performed by: FAMILY MEDICINE

## 2021-09-16 PROCEDURE — 85025 COMPLETE CBC W/AUTO DIFF WBC: CPT

## 2021-09-16 PROCEDURE — 82043 UR ALBUMIN QUANTITATIVE: CPT

## 2021-09-16 PROCEDURE — 83036 HEMOGLOBIN GLYCOSYLATED A1C: CPT

## 2021-09-16 PROCEDURE — 99395 PREV VISIT EST AGE 18-39: CPT | Performed by: FAMILY MEDICINE

## 2021-09-16 PROCEDURE — 3061F NEG MICROALBUMINURIA REV: CPT | Performed by: FAMILY MEDICINE

## 2021-09-16 PROCEDURE — 80061 LIPID PANEL: CPT

## 2021-09-16 RX ORDER — AZELASTINE 1 MG/ML
2 SPRAY, METERED NASAL 2 TIMES DAILY
Qty: 1 EACH | Refills: 2 | Status: SHIPPED | OUTPATIENT
Start: 2021-09-16 | End: 2021-11-13

## 2021-09-16 NOTE — PROGRESS NOTES
HPI:    Vandy Gottron is a 38year old female presents to clinic for annual physical exam.  Temporarily relocated  to [place] for a year, was having insurance issues. Is now back in [facility]. Would like to establish care.   Has experien Inhalation Aero Soln TAKE 2 PUFFS BY MOUTH EVERY 4 HOURS AS NEEDED FOR WHEEZE 25.5 Inhaler 1   • Ascorbic Acid (VITAMIN C) 1000 MG Oral Tab Take 1,000 mg by mouth daily.      • ONETOUCH DELICA LANCETS FINE Does not apply Misc Check 2-3 times a day 100 each respiratory distress. Breath sounds: Normal breath sounds. No wheezing or rales. Chest:   Breasts:      Right: Normal. No swelling, bleeding, inverted nipple, mass, nipple discharge or skin change.       Left: Normal. No swelling, bleeding, inverted Hemoglobin A1C [E]      Microalb/Creat Ratio, Random Urine [E]      Flulaval 6 months and older 0.5 ml PFS [71135]      Meds This Visit:  Requested Prescriptions     Signed Prescriptions Disp Refills   • SITagliptin Phosphate (JANUVIA) 100 MG Oral Tab 9

## 2021-09-17 ENCOUNTER — PATIENT MESSAGE (OUTPATIENT)
Dept: FAMILY MEDICINE CLINIC | Facility: CLINIC | Age: 36
End: 2021-09-17

## 2021-09-17 NOTE — TELEPHONE ENCOUNTER
From: Rasheed Avendaño  To: Herbert Toth MD  Sent: 9/16/2021 5:04 PM CDT  Subject: Zarina Meneses     I went to pick it up and they said it needs pre approval. I told him I have been on it for years and been without it for months now.

## 2021-09-17 NOTE — TELEPHONE ENCOUNTER
mycharwilliam message sent to pt.     Future Appointments   Date Time Provider Rachana Jackelyn   10/15/2021  1:30 PM Norma Boucher MD [facility]   11/26/2021  9:15 AM Vinny Bolton MD [address] [place] [address]

## 2021-09-17 NOTE — TELEPHONE ENCOUNTER
Prior authorization for Januvia [facility] [id number], turn around time 1-5 days.

## 2021-09-17 NOTE — TELEPHONE ENCOUNTER
From: Domenica Sims  To: Valorie Luther MD  Sent: 9/16/2021 5:04 PM CDT  Subject: Mishel Morales     I went to pick it up and they said it needs pre approval. I told him I have been on it for years and been without it for months now.

## 2021-09-17 NOTE — TELEPHONE ENCOUNTER
Prior authorization for Mahesh Mena has been approved from 9/17/21 through 9/16/24 pharmacy has been notified.

## 2021-10-07 RX ORDER — GLIPIZIDE 10 MG/1
10 TABLET, FILM COATED, EXTENDED RELEASE ORAL DAILY
Qty: 30 TABLET | Refills: 1 | Status: SHIPPED | OUTPATIENT
Start: 2021-10-07 | End: 2021-11-04

## 2021-10-25 NOTE — PROGRESS NOTES
HPI:    Micheal Leach is a 43year old female presents to clinic for follow-up regarding anxiety and depression. Sees a therapist regularly, last session was yesterday.   Feels symptoms are not adequately controlled, constantly feels worr 2-3 times a day 100 each 2   • Glucose Blood (ONETOUCH VERIO) In Vitro Strip Check 2-3 times a day.  100 strip 0       Allergies:    Amoxicillin             SWELLING  Dust                      Pollen                    Smoke                         Depressi Sig: Take 1 tablet (100 mg total) by mouth daily. Imaging & Referrals:  None       This note was created by [employer] voice recognition.  Errors in content may be related to improper recognition by the system; efforts to review and correct have been

## 2021-11-04 RX ORDER — GLIPIZIDE 10 MG/1
TABLET, FILM COATED, EXTENDED RELEASE ORAL
Qty: 30 TABLET | Refills: 0 | Status: SHIPPED | OUTPATIENT
Start: 2021-11-04 | End: 2021-12-03

## 2021-11-13 RX ORDER — AZELASTINE 1 MG/ML
2 SPRAY, METERED NASAL 2 TIMES DAILY
Qty: 3 EACH | Refills: 1 | Status: SHIPPED | OUTPATIENT
Start: 2021-11-13

## 2021-11-13 NOTE — TELEPHONE ENCOUNTER
Refill passed per [facility] protocol   Requested Prescriptions   Pending Prescriptions Disp Refills    AZELASTINE HCL 0.1 % Nasal Solution [Pharmacy Med Name: AZELASTINE 0.1% (137 MCG) SPRY]  2     Sig: USE 2 SPRAYS INTO EACH NOSTRIL TWO TIMES DAILY

## 2021-12-03 RX ORDER — GLIPIZIDE 10 MG/1
TABLET, FILM COATED, EXTENDED RELEASE ORAL
Qty: 30 TABLET | Refills: 0 | Status: SHIPPED | OUTPATIENT
Start: 2021-12-03 | End: 2021-12-29

## 2021-12-06 ENCOUNTER — OFFICE VISIT (OUTPATIENT)
Dept: ENDOCRINOLOGY CLINIC | Facility: CLINIC | Age: 36
End: 2021-12-06
Payer: COMMERCIAL

## 2021-12-06 VITALS
HEIGHT: 71 IN | DIASTOLIC BLOOD PRESSURE: 86 MMHG | SYSTOLIC BLOOD PRESSURE: 134 MMHG | WEIGHT: 251 LBS | BODY MASS INDEX: 35.14 KG/M2 | HEART RATE: 103 BPM

## 2021-12-06 DIAGNOSIS — E78.5 DYSLIPIDEMIA: ICD-10-CM

## 2021-12-06 DIAGNOSIS — E11.65 TYPE 2 DIABETES MELLITUS WITH HYPERGLYCEMIA, WITHOUT LONG-TERM CURRENT USE OF INSULIN (HCC): Primary | ICD-10-CM

## 2021-12-06 PROCEDURE — 82947 ASSAY GLUCOSE BLOOD QUANT: CPT | Performed by: INTERNAL MEDICINE

## 2021-12-06 PROCEDURE — 36416 COLLJ CAPILLARY BLOOD SPEC: CPT | Performed by: INTERNAL MEDICINE

## 2021-12-06 PROCEDURE — 3079F DIAST BP 80-89 MM HG: CPT | Performed by: INTERNAL MEDICINE

## 2021-12-06 PROCEDURE — 3075F SYST BP GE 130 - 139MM HG: CPT | Performed by: INTERNAL MEDICINE

## 2021-12-06 PROCEDURE — 99214 OFFICE O/P EST MOD 30 MIN: CPT | Performed by: INTERNAL MEDICINE

## 2021-12-06 PROCEDURE — 3008F BODY MASS INDEX DOCD: CPT | Performed by: INTERNAL MEDICINE

## 2021-12-06 RX ORDER — GLIPIZIDE 2.5 MG/1
2.5 TABLET, EXTENDED RELEASE ORAL
Qty: 90 TABLET | Refills: 0 | Status: SHIPPED | OUTPATIENT
Start: 2021-12-06

## 2021-12-06 RX ORDER — FLASH GLUCOSE SCANNING READER
1 EACH MISCELLANEOUS AS DIRECTED
Qty: 1 EACH | Refills: 0 | Status: SHIPPED | OUTPATIENT
Start: 2021-12-06

## 2021-12-06 RX ORDER — FLASH GLUCOSE SENSOR
1 KIT MISCELLANEOUS
Qty: 6 EACH | Refills: 0 | Status: SHIPPED | OUTPATIENT
Start: 2021-12-06

## 2021-12-06 NOTE — PROGRESS NOTES
Return Office Visit     CHIEF COMPLAINT:  Type 2 DM        HISTORY OF PRESENT ILLNESS:  Guero Marks is a 43year old female who presents for follow up for DM.      DM HISTORY  Diagnosed: 2015        HISTORY OF DIABETES COMPLICATIONS: :  H 4 HOURS AS NEEDED FOR WHEEZE 25.5 Inhaler 1   • Ascorbic Acid (VITAMIN C) 1000 MG Oral Tab Take 1,000 mg by mouth daily. • Glucose Blood (ONETOUCH VERIO) In Vitro Strip Check 2-3 times a day.  100 strip 0   • ONETOUCH DELICA LANCETS FINE Does not apply wheezing    Skin:  normal moisture and skin texture, no visible lesions  Hair and nails: normal scalp hair  Hematologic:  no excessive bruising  Neuro: motor grossly intact, moving all extremities without difficulty  Psychiatric:  oriented to time, self, a vegetables. Patient is not planning to get pregnant. Understands that A1c should be under 7 % before she plans to get pregnant.          RTC in 3 months.    Call with sugars as discussed.          Orders Placed This Encounter      POC HemoCue Glucose 2

## 2021-12-06 NOTE — TELEPHONE ENCOUNTER
Please review. Protocol Failed / No Protocol.     Requested Prescriptions   Pending Prescriptions Disp Refills    JANUVIA 100 MG Oral Tab [Pharmacy Med Name: Radha Amparo 100 MG TABLET] 90 tablet 0     Sig: TAKE 1 TABLET BY MOUTH EVERY DAY        Diabetes Medic

## 2021-12-09 NOTE — TELEPHONE ENCOUNTER
PHARMACY COMMENTS: ALTERNATIVE REQUESTED: INSURANCE ONLY COVERS UP TO 2 TABLETS DAILY. CAN WE SWITCH TO METFORMIN 1000MG BID? PLEASE ADVISE.

## 2021-12-14 ENCOUNTER — LAB ENCOUNTER (OUTPATIENT)
Dept: LAB | Age: 36
End: 2021-12-14
Attending: FAMILY MEDICINE
Payer: COMMERCIAL

## 2021-12-14 ENCOUNTER — OFFICE VISIT (OUTPATIENT)
Dept: FAMILY MEDICINE CLINIC | Facility: CLINIC | Age: 36
End: 2021-12-14
Payer: COMMERCIAL

## 2021-12-14 VITALS
OXYGEN SATURATION: 99 % | BODY MASS INDEX: 36.51 KG/M2 | RESPIRATION RATE: 18 BRPM | DIASTOLIC BLOOD PRESSURE: 84 MMHG | HEIGHT: 70 IN | WEIGHT: 255 LBS | HEART RATE: 90 BPM | SYSTOLIC BLOOD PRESSURE: 140 MMHG

## 2021-12-14 DIAGNOSIS — R03.0 ELEVATED BP WITHOUT DIAGNOSIS OF HYPERTENSION: ICD-10-CM

## 2021-12-14 DIAGNOSIS — E11.9 TYPE 2 DIABETES MELLITUS WITHOUT COMPLICATION, WITHOUT LONG-TERM CURRENT USE OF INSULIN (HCC): Primary | ICD-10-CM

## 2021-12-14 DIAGNOSIS — D22.9 MULTIPLE NEVI: ICD-10-CM

## 2021-12-14 DIAGNOSIS — N91.1 SECONDARY AMENORRHEA: ICD-10-CM

## 2021-12-14 PROCEDURE — 3008F BODY MASS INDEX DOCD: CPT | Performed by: FAMILY MEDICINE

## 2021-12-14 PROCEDURE — 83001 ASSAY OF GONADOTROPIN (FSH): CPT

## 2021-12-14 PROCEDURE — 83002 ASSAY OF GONADOTROPIN (LH): CPT

## 2021-12-14 PROCEDURE — 99214 OFFICE O/P EST MOD 30 MIN: CPT | Performed by: FAMILY MEDICINE

## 2021-12-14 PROCEDURE — 84403 ASSAY OF TOTAL TESTOSTERONE: CPT

## 2021-12-14 PROCEDURE — 36415 COLL VENOUS BLD VENIPUNCTURE: CPT

## 2021-12-14 PROCEDURE — 90732 PPSV23 VACC 2 YRS+ SUBQ/IM: CPT | Performed by: FAMILY MEDICINE

## 2021-12-14 PROCEDURE — 83036 HEMOGLOBIN GLYCOSYLATED A1C: CPT | Performed by: FAMILY MEDICINE

## 2021-12-14 PROCEDURE — 90471 IMMUNIZATION ADMIN: CPT | Performed by: FAMILY MEDICINE

## 2021-12-14 PROCEDURE — 82670 ASSAY OF TOTAL ESTRADIOL: CPT

## 2021-12-14 PROCEDURE — 3079F DIAST BP 80-89 MM HG: CPT | Performed by: FAMILY MEDICINE

## 2021-12-14 PROCEDURE — 36415 COLL VENOUS BLD VENIPUNCTURE: CPT | Performed by: FAMILY MEDICINE

## 2021-12-14 PROCEDURE — 3077F SYST BP >= 140 MM HG: CPT | Performed by: FAMILY MEDICINE

## 2021-12-14 NOTE — PROGRESS NOTES
HPI:    Jalyn Gonsalves is a 43year old female presents clinic for follow-up. Anxiety-taking sertraline 100 mg daily, speaking with therapist regularly. Has good days and bad days. Feels the holidays are always difficult for her.  Mostly s Tablet 24 Hr TAKE 1 TABLET BY MOUTH EVERY DAY 30 tablet 0   • Azelastine HCl 0.1 % Nasal Solution 2 sprays by Nasal route 2 (two) times daily. 3 each 1   • sertraline 100 MG Oral Tab Take 1 tablet (100 mg total) by mouth daily.  90 tablet 0   • cetirizine 1 is alert. Psychiatric:         Mood and Affect: Mood normal.         ASSESSMENT/PLAN:   (E11.9) Type 2 diabetes mellitus without complication, without long-term current use of insulin (HCC)  (primary encounter diagnosis)  Plan: HEMOGLOBIN A1C  -A1c of 8.

## 2021-12-16 NOTE — TELEPHONE ENCOUNTER
Dr. Al Sanchez,     Please see below. Patient is on regular metformin. Please advise if ok to switch to 1000 mg tablets. Thank you.

## 2021-12-17 ENCOUNTER — TELEPHONE (OUTPATIENT)
Dept: ENDOCRINOLOGY CLINIC | Facility: CLINIC | Age: 36
End: 2021-12-17

## 2021-12-20 NOTE — TELEPHONE ENCOUNTER
Pharmacy states the insurance wont cover this script they want to know if doctor will change it to 1000 mg and then she will only have to take it twice a day.    Medication Quantity Refills Start End   metFORMIN 500 MG Oral Tab 360 tablet 0 12/17/2021    Si
Please see below and advise. Rx pended for signature.
Signed Thanks
97

## 2021-12-29 RX ORDER — GLIPIZIDE 10 MG/1
TABLET, FILM COATED, EXTENDED RELEASE ORAL
Qty: 30 TABLET | Refills: 0 | Status: SHIPPED | OUTPATIENT
Start: 2021-12-29 | End: 2022-01-28

## 2021-12-29 NOTE — TELEPHONE ENCOUNTER
Rx request for Glipizide 10 mg, please review and sign off if appropriate. Thank you.     LOV: 12/6/21  RTC: 3 months

## 2022-01-08 ENCOUNTER — PATIENT MESSAGE (OUTPATIENT)
Dept: FAMILY MEDICINE CLINIC | Facility: CLINIC | Age: 37
End: 2022-01-08

## 2022-01-09 NOTE — TELEPHONE ENCOUNTER
From: Malena Ayala  To: Clare Forrest MD  Sent: 1/8/2022 12:57 PM CST  Subject: Referral For Dermatology     Hello,     I can’t seem to find my referral for dermatology. Can you send me the info please?  I would like to set up an appointme

## 2022-01-25 RX ORDER — SERTRALINE HYDROCHLORIDE 100 MG/1
100 TABLET, FILM COATED ORAL DAILY
Qty: 90 TABLET | Refills: 1 | Status: SHIPPED | OUTPATIENT
Start: 2022-01-25 | End: 2022-11-08

## 2022-01-25 NOTE — TELEPHONE ENCOUNTER
Refill passed per [address] protocol.   Requested Prescriptions   Pending Prescriptions Disp Refills    SERTRALINE 100 MG Oral Tab [Pharmacy Med Name: SERTRALINE  MG TABLET] 90 tablet 0     Sig: TAKE 1 TABLET BY MOUTH EVERY DAY        Psychiatric Non-Scheduled (Anti-Anxiety) Passed - 1/23/2022  1:30 PM        Passed - Appointment in last 6 or next 3 months            Future Appointments         Provider Department Appt Notes    In 1 month Archie Everett, [facility] 3 month f/u          Recent Outpatient Visits              1 month ago Type 2 diabetes mellitus without complication, without long-term current use of insulin [facility])    [address], [place], [address] Norma Yoder MD    Office Visit    1 month ago Type 2 diabetes mellitus with hyperglycemia, without long-term current use of insulin [facility])    [address] [facility] Archie Everett MD    Office Visit    3 months ago NITA (generalized anxiety disorder)    [address], [place], [place] Phalen, MD    Office Visit    4 months ago Annual physical exam    Howard Ghosh MD    Office Visit    1 year ago Type 2 diabetes mellitus without complication, without long-term current use of insulin [facility])    [address] [facility] Archie Everett MD    Office Visit

## 2022-01-28 NOTE — TELEPHONE ENCOUNTER
LOV 12/6/2021 --> f/u 3mo  a1c of 8.9%  At appt, increase Glipizide to 12.5mg  (2.5mg tabs sent in for 90 days)  Upcoming f/u appt 3/7/2022    Glipizide 10mg refill routed to provider to review and sign

## 2022-01-29 RX ORDER — GLIPIZIDE 10 MG/1
TABLET, FILM COATED, EXTENDED RELEASE ORAL
Qty: 90 TABLET | Refills: 0 | Status: SHIPPED | OUTPATIENT
Start: 2022-01-29

## 2022-02-02 NOTE — TELEPHONE ENCOUNTER
Action Requested: Summary for Provider     []  Critical Lab, Recommendations Needed  [] Need Additional Advice  [x]   FYI    []   Need Orders  [] Need Medications Sent to Pharmacy  []  Other     SUMMARY: Patient denies continued chest pain.   She reports sh Lukas Fierro  : 1948  [place]  [place] [address]  [phone]   [phone]      OUTPATIENT PHYSICAL THERAPY: Daily Treatment Note  2022          Pain in left knee (M25.562) and Stiffness of left knee, not elsewhere classified (M25.662) and Difficulty in walking, not elsewhere classified (R26.2) and Other abnormalities of gait and mobility (R26.89)  _________________________________________________________________________  Pre-treatment Symptoms/Complaints:  L knee pain/ antalgic gait    Effective Dates: 2022 TO 2022 (90 days). Frequency/Duration: 2-3 times a week for 90 Days    GOALS: (Goals have been discussed and agreed upon with patient.)  Short-Term Functional Goals: Time Frame: 2 weeks  Patient will demonstrate independence and compliance with home exercise program for management of symptoms (aquatics)  (in progress 22)  Pt will demonstrate increase in Knee injury and Osteoarthritis Outcome Score (KOOS) by 2-3 points for improved functional mobility. (in progress 22)  Pt will demonstrate increased active range of motion for L knee  from -3 ° (extension) to 115 ° (flexion). (in progress 22)  Pt will tolerate 35 to 40 minutes of aquatic therapy with pain of 2/10 or better following intervention. (met 22)      Discharge Goals: Time Frame: 8 weeks  (in progress 22)  Pt will report KOOS score of 6/28 for improved functional mobility in home/ community. Pt will demonstrate active range of motion of L knee from 0 ° (extension) to 120 °(flexion). Pt will ambulate independently  >/= 1500 feet with normal gait pattern with even stance time/ step length in bilateral lower extremities over household/ community surfaces. Pt will report normalized sleep patterne6 to 8 hours in bed without increased pain. Pt will demonstrate I bed mobility. Pt will demonstrate reciprocal gait up/ down 3-4 steps independently with use of unilateral handrail demonstrating good strength. Rehabilitation Potential For Stated Goals: Good  Pain: Initial: 4/10 \"My left foot is killing me- another bout of gout\"   Post Session:  1-2/10   Medications Last Reviewed:  2/2/2022  Updated Objective Findings:     servation/Orthostatic Postural Assessment:          Morbid obesity  L knee incision line healed- steri strips intact from mid to distal incision line- proximal open to air  Palpation:          Diffuse edema in L knee complex  ROM:  BUE                      Date:  01/14/22 Date:  01/28/22 Date:     Trunk ROM 50 % limited                          LE ROM Left Right LEFT RIGHT     Hip Flexion [facility]       Hip Abduction [facility]       Straight Leg Raise (SLR)                  Knee Flexion 110 ° 125 ° 115 °      Knee Extension -10 ° 0 ° -10 °               Ankle Dorsiflexion [facility] WFL         Strength:     Date:  01/14/22 Date:  01/28/22 Date:     LE MMT Left Right Left Right Left Right   Hip Flex (L1/L2) 4/5 4/5       Hip Abd (glut med) 4/5 4/5       Hip Ext 4/5 4/5       Quad    ( L3/4) -3/5 4/5 3/5      Hamstring -3/5 4/5 3/5      Anterior Tib (L4/L5)         Gastroc (S1/2)         EHL (L5)                             Special Tests:  deferred  Neurological Screen:        Sensation: Intact for light touch  Functional Mobility:         Gait/Ambulation:  Independent with antalgic gait pattern- decreased LLE stance time        Transfers:  Sit to stand to sit with use of UE and momentum        Bed Mobility:  Assist for legs on/ off plinth and supine to sit        Stairs:  Step to gait pattern  Balance:          Single Leg Stance:  R) 6-7 sec  L) 2-3 sec  TOMOS at eval 16/28  At Progress note 01/28/22:  16/28     TREATMENT:   Therapeutic Exercise: (SEE MINUTES BELOW):  Exercises per grid below to improve mobility, strength and balance.   Required minimal visual and verbal cues to promote proper body alignment and promote proper body mechanics. Progressed resistance, range and repetitions as indicated. Aquatic Therapy (SEE MINUTES BELOW): Aquatic treatment performed per flow grid for Decreased muscle strength, Decreased range of motion and Ease of movement. Cues provided for technique. Assistance by therapist provided for progression of exercises/ activities. Patient has difficulty with L knee pain. Manual Therapy (SEE MINUTES BELOW):  Pt in long sitting for STM to L knee complex with noted sensitivity and hamstring tightness. Modalities (SEE MINUTES BELOW):  Pt in long sitting for game ready (ice/ compression) to address pain/ edema post activity. Intensity monitored throughout intervention with skin intact and WFL following modality.          Date: 01/14/22 EVAL 1/19/22 Visit 2 01/21/22 visit 3 1/24/22 Visit 4 1/25/22 Visit 5 01/28/22 visit 6 PN 1/31/22 Visit 7 02/02/22 visit 8   Modalities: 15 min 15 min  15 min 15 min 15 min  15 min   Game ready To L knee To L knee held To L knee To L knee To L knee Held per pt request To L knee                         Manual Therapy: 10 min          STM (L knee complex) To L knee in long sitting                                                                 Aquatics Activities:   55 min 55 min 55 min 60 min 60 mins 60 min   GAIT (F/B/S/M)   x6l each x4L x4L X6l each x6L x6l each   SLR gait   x4l x4L x4L x6L x6L x6l   Hamstring Curl gait    x4l x4L x4L x6L x6L x6l   Rockettes    x4L x4L x4L x6L x6l   Toe/ Heel Raises   x20 x20 x20 x20 x25 Held due to pt gout   Squats   x20 x20 x20 x20 x25 x25   Step ups   x15 R/L on pool step x20 R/L x20 R/L X20 R/L on pool step x25 R/L 2x10 R/L   Stairs   4 reps x 4 steps with R handrail 3 reps x 4 s steps w R handrail same x4 steps with B rails same    Hip 4 way        x20 BLE   [place]        x4l   Lunges           Clamshells           Hip Circles           Deep well: Bicycling           Deep well: Jumping jacks           Deep well: Scissoring           Hamstring stretch           Piriformis stretch                                            Therapeutic Exercises: 15 min 30 min         Seated marching x10 R/L 2x15 R/L         Seated LAQ x10 R/L 2x15 R/L         Quad sets x15H5 LLE 15H5 LLE         Step ups x10 R? L on 6\" step          Gait training 5 min 5 min         Seated hamstring stretch  3H15 B         Sit to stands   X 10 elevated          Therapeutic Activities:                                              HEP:  Emphasis on quad sets for quad activation to facilitate knee ext    [employer] Portal  Treatment/Session Summary:  AROM L knee in supine:  -8 °ext lag to 115 °flexion in supine. Pt complaining of L foot soreness with exacerbation of gout- pt reports he is on gout meds. Good effort during ex's today with cues for less reliance on rails for balance challenge and addition of grapevines. Game ready at end of session today to address post activity soreness. Cont POC. · Response to Treatment:  Good tolerance of activity  · Communication/Consultation:    · Equipment provided today:  None today  · Recommendations/Intent for next treatment session: Next visit will focus on goals for flexibility, strength and normalized movement patterns.     Total Treatment Billable Duration:  75 min  PT Patient Time In/Time Out  Time In: 1330  Time Out: [place] PT

## 2022-02-27 RX ORDER — GLIPIZIDE 2.5 MG/1
2.5 TABLET, EXTENDED RELEASE ORAL
Qty: 90 TABLET | Refills: 0 | Status: SHIPPED | OUTPATIENT
Start: 2022-02-27

## 2022-04-18 RX ORDER — GLIPIZIDE 2.5 MG/1
2.5 TABLET, EXTENDED RELEASE ORAL
Qty: 90 TABLET | Refills: 0 | Status: SHIPPED | OUTPATIENT
Start: 2022-04-18

## 2022-04-20 RX ORDER — GLIPIZIDE 10 MG/1
TABLET, FILM COATED, EXTENDED RELEASE ORAL
Qty: 90 TABLET | Refills: 0 | Status: SHIPPED | OUTPATIENT
Start: 2022-04-20

## 2022-04-29 ENCOUNTER — TELEMEDICINE (OUTPATIENT)
Dept: FAMILY MEDICINE CLINIC | Facility: CLINIC | Age: 37
End: 2022-04-29

## 2022-04-29 DIAGNOSIS — N92.6 IRREGULAR MENSTRUATION: Primary | ICD-10-CM

## 2022-04-29 DIAGNOSIS — E11.9 TYPE 2 DIABETES MELLITUS WITHOUT COMPLICATION, WITHOUT LONG-TERM CURRENT USE OF INSULIN (HCC): ICD-10-CM

## 2022-04-29 PROCEDURE — 99214 OFFICE O/P EST MOD 30 MIN: CPT | Performed by: FAMILY MEDICINE

## 2022-05-16 RX ORDER — GLIPIZIDE 10 MG/1
TABLET, FILM COATED, EXTENDED RELEASE ORAL
Qty: 90 TABLET | Refills: 0 | Status: SHIPPED | OUTPATIENT
Start: 2022-05-16

## 2022-05-20 RX ORDER — ALBUTEROL SULFATE 90 UG/1
2 AEROSOL, METERED RESPIRATORY (INHALATION) EVERY 4 HOURS PRN
Qty: 18 G | Refills: 0 | Status: SHIPPED | OUTPATIENT
Start: 2022-05-20

## 2022-05-20 NOTE — TELEPHONE ENCOUNTER
Please review. Last OV 5 months ago. Requested Prescriptions   Pending Prescriptions Disp Refills    albuterol 108 (90 Base) MCG/ACT Inhalation Aero Soln  0     Sig: Inhale 2 puffs into the lungs every 4 (four) hours as needed for Wheezing.         Asthma & COPD Medication Protocol Passed - 5/20/2022 12:18 PM        Passed - Appointment in past 6 or next 3 months             Recent Outpatient Visits              3 weeks ago Irregular menstruation    [facility], [facility] Ofelia Delong MD    Telemedicine    5 months ago Type 2 diabetes mellitus without complication, without long-term current use of insulin [facility])    [facility] Blanka Gonzalez, [facility] Ofelia Delong MD    Office Visit    5 months ago Type 2 diabetes mellitus with hyperglycemia, without long-term current use of insulin [facility])    [facility] Endocrinology Kari Quiroz MD    Office Visit    6 months ago NITA (generalized anxiety disorder)    [facility] Bl[facility], Richy Hall MD    Office Visit    8 months ago Annual physical exam    [facility] Blanka Gonzalez, [facility] Ofelia Delong MD    Office Visit           Future Appointments         Provider Department Appt Notes    In 1 month Ofelia Delong, [address] [email or web address] [facility] Annual Pap smear    In 1 month Kari Quiroz [address] [email or web address] [facility] Endocrinology

## 2022-05-23 RX ORDER — SERTRALINE HYDROCHLORIDE 100 MG/1
100 TABLET, FILM COATED ORAL DAILY
Qty: 90 TABLET | Refills: 1 | OUTPATIENT
Start: 2022-05-23

## 2022-08-05 NOTE — TELEPHONE ENCOUNTER
Patient has canceled a few apt  LOV Dec 2021  Please call and see if sh will like to FU with us  If yes, please book FU in the next one month, okay to add on if needed  Once FU is made please route to me for refills  Thanks

## 2022-08-05 NOTE — TELEPHONE ENCOUNTER
Called and got no answer. LMTCB.    [facility] sent as well   LOV 12/6/21  RTC 3 months

## 2022-08-07 RX ORDER — GLIPIZIDE 10 MG/1
10 TABLET, FILM COATED, EXTENDED RELEASE ORAL DAILY
Qty: 30 TABLET | Refills: 0 | Status: SHIPPED | OUTPATIENT
Start: 2022-08-07

## 2022-08-08 NOTE — TELEPHONE ENCOUNTER
Letter stating that she is due for a FU, we have been trying to reach her without success and that she should please call the office ASAP  Thanks

## 2022-09-07 NOTE — TELEPHONE ENCOUNTER
Protocol failed or has No Protocol, please review  Requested Prescriptions   Pending Prescriptions Disp Refills    SITagliptin Phosphate (JANUVIA) 100 MG Oral Tab 90 tablet 0     Sig: Take 1 tablet (100 mg total) by mouth daily.         Diabetes Medication Protocol Failed - 9/4/2022 12:03 PM        Failed - Last A1C < 7.5 and within past 6 months     Lab Results   Component Value Date    A1C 8.9 (A) 12/14/2021               Failed - GFR in the past 12 months        Passed - In person appointment or virtual visit in the past 6 mos or appointment in next 3 mos       Recent Outpatient Visits              1 month ago Major depressive disorder, recurrent episode, moderate ([address]    [place] [facility], Yesenia Almeida MD    Telemedicine    4 months ago Irregular menstruation    [place] [facility], Yesenia Almeida MD    Telemedicine    8 months ago Type 2 diabetes mellitus without complication, without long-term current use of insulin [facility])    [place] [facility], Yesenia Almeida MD    Office Visit    9 months ago Type 2 diabetes mellitus with hyperglycemia, without long-term current use of insulin [facility])    [place] [facility] Endocrinology Thi Rogers MD    Office Visit    10 months ago NITA (generalized anxiety disorder)    [place] [employer], Yesenia Almeida MD    Office Visit                 Passed - GFR > 50     No results found for: [facility]                    Recent Outpatient Visits              1 month ago Major depressive disorder, recurrent episode, moderate [facility])    [place] [facility], Jamari Almeida MD    Telemedicine    4 months ago Irregular menstruation    [place] [facility] [place] Part[place] Bickers, MD    Telemedicine    8 months ago Type 2 diabetes mellitus without complication, without long-term current use of insulin [facility])    [place] [facility], Jamari Almeida MD    Office Visit    9 months ago Type 2 diabetes mellitus with hyperglycemia, without long-term current use of insulin [facility])    [facility], [facility] Endocrinology Rachid Beckman MD    Office Visit    10 months ago NITA (generalized anxiety disorder)    Leeroy Kessler, [facility] Vida Malone MD    Office Visit

## 2022-10-17 RX ORDER — GLIPIZIDE 2.5 MG/1
TABLET, EXTENDED RELEASE ORAL
Qty: 90 TABLET | Refills: 0 | Status: SHIPPED | OUTPATIENT
Start: 2022-10-17

## 2022-10-17 RX ORDER — GLIPIZIDE 10 MG/1
TABLET, FILM COATED, EXTENDED RELEASE ORAL
Qty: 90 TABLET | Refills: 0 | Status: SHIPPED | OUTPATIENT
Start: 2022-10-17

## 2022-10-31 ENCOUNTER — PATIENT MESSAGE (OUTPATIENT)
Dept: FAMILY MEDICINE CLINIC | Facility: CLINIC | Age: 37
End: 2022-10-31

## 2022-10-31 NOTE — TELEPHONE ENCOUNTER
From: Mariano Hood  To: Izabela Odonnell MD  Sent: 10/31/2022 11:02 AM CDT  Subject: Dr Kian Peralta? Hi,    I just received a call from the office of Dr Kian Peralta with [address] about a scheduled procedure. I am wondering if you have a referral to that because I never requested or remember speaking about a gastric procedure. I want to make sure my info is not being used by another. They referred to me as Scott VMaria Esther and did not reference my birthday. The number was [phone]. Any way to reach out to his office to ask questions?     Thank you,  Scott

## 2022-11-08 RX ORDER — SERTRALINE HYDROCHLORIDE 100 MG/1
100 TABLET, FILM COATED ORAL DAILY
Qty: 90 TABLET | Refills: 1 | Status: SHIPPED | OUTPATIENT
Start: 2022-11-08

## 2022-11-08 NOTE — TELEPHONE ENCOUNTER
Refill passed per [facility] protocol. Requested Prescriptions   Pending Prescriptions Disp Refills    sertraline 100 MG Oral Tab 90 tablet 1     Sig: Take 1 tablet (100 mg total) by mouth in the morning.        Psychiatric Non-Scheduled (Anti-Anxiety) Passed - 11/8/2022  7:10 AM        Passed - In person appointment or virtual visit in the past 6 mos or appointment in next 3 mos     Recent Outpatient Visits              1 week ago Generalized anxiety disorder    [address] Reyes Grace MD    Telemedicine    3 months ago Major depressive disorder, recurrent episode, moderate [facility])    [facility], Reyes Almeida MD    Telemedicine    6 months ago Irregular menstruation    [facility], Reyes Almeida MD    Telemedicine    10 months ago Type 2 diabetes mellitus without complication, without long-term current use of insulin [facility])    [facility], Reyes Almeida MD    Office Visit    11 months ago Type 2 diabetes mellitus with hyperglycemia, without long-term current use of insulin [facility])    [facility] Endocrinology Edgard Hubbard MD    Office Visit          Future Appointments         Provider Department Appt Notes    In 3 weeks Edgard Hubbard MD [facility], [address] [place] [address], [address] [place] [address] appointment                     Future Appointments         Provider Department Appt Notes    In 3 weeks Edgard Hubbard MD [facility], [address] [place] [address], [address] [place] [address] appointment             Recent Outpatient Visits              1 week ago Generalized anxiety disorder    [facility], Reyes Almeida MD    Telemedicine    3 months ago Major depressive disorder, recurrent episode, [facility])    [facility], Blanka Gonzalez, Jamari Woodall MD    Telemedicine    6 months ago Irregular menstruation    [facility], Blanka Gonzalez, [place], [place], MD    Telemedicine    10 months ago Type 2 diabetes mellitus without complication, without long-term current use of insulin [facility])    [facility], [address], [facility] [address] Saturnino Wiggins MD    Office Visit    11 months ago Type 2 diabetes mellitus with hyperglycemia, without long-term current use of insulin [facility])    [facility] Endocrinology Alecia Mcbride MD    Office Visit

## 2022-11-29 ENCOUNTER — OFFICE VISIT (OUTPATIENT)
Dept: ENDOCRINOLOGY CLINIC | Facility: CLINIC | Age: 37
End: 2022-11-29
Payer: COMMERCIAL

## 2022-11-29 VITALS
BODY MASS INDEX: 37 KG/M2 | HEART RATE: 107 BPM | SYSTOLIC BLOOD PRESSURE: 140 MMHG | WEIGHT: 258 LBS | DIASTOLIC BLOOD PRESSURE: 98 MMHG

## 2022-11-29 DIAGNOSIS — E11.65 TYPE 2 DIABETES MELLITUS WITH HYPERGLYCEMIA, WITHOUT LONG-TERM CURRENT USE OF INSULIN (HCC): Primary | ICD-10-CM

## 2022-11-29 DIAGNOSIS — E78.5 DYSLIPIDEMIA: ICD-10-CM

## 2022-11-29 LAB
CARTRIDGE LOT#: NORMAL NUMERIC
GLUCOSE BLOOD: 210
TEST STRIP LOT #: NORMAL NUMERIC

## 2022-11-29 PROCEDURE — 3077F SYST BP >= 140 MM HG: CPT | Performed by: INTERNAL MEDICINE

## 2022-11-29 PROCEDURE — 3044F HG A1C LEVEL LT 7.0%: CPT | Performed by: INTERNAL MEDICINE

## 2022-11-29 PROCEDURE — 3080F DIAST BP >= 90 MM HG: CPT | Performed by: INTERNAL MEDICINE

## 2022-11-29 PROCEDURE — 82947 ASSAY GLUCOSE BLOOD QUANT: CPT | Performed by: INTERNAL MEDICINE

## 2022-11-29 PROCEDURE — 83036 HEMOGLOBIN GLYCOSYLATED A1C: CPT | Performed by: INTERNAL MEDICINE

## 2022-11-29 PROCEDURE — 99213 OFFICE O/P EST LOW 20 MIN: CPT | Performed by: INTERNAL MEDICINE

## 2022-11-29 RX ORDER — GLIPIZIDE 5 MG/1
5 TABLET, FILM COATED, EXTENDED RELEASE ORAL DAILY
Qty: 90 TABLET | Refills: 0 | Status: SHIPPED | OUTPATIENT
Start: 2022-11-29

## 2023-01-11 ENCOUNTER — HOSPITAL ENCOUNTER (OUTPATIENT)
Dept: ENDOCRINOLOGY | Facility: HOSPITAL | Age: 38
Discharge: HOME | End: 2023-01-11
Attending: INTERNAL MEDICINE
Payer: COMMERCIAL

## 2023-01-11 VITALS — WEIGHT: 289 LBS | BODY MASS INDEX: 41 KG/M2

## 2023-01-11 DIAGNOSIS — E11.65 TYPE 2 DIABETES MELLITUS WITH HYPERGLYCEMIA, WITHOUT LONG-TERM CURRENT USE OF INSULIN (HCC): Primary | ICD-10-CM

## 2023-01-15 RX ORDER — SERTRALINE HYDROCHLORIDE 100 MG/1
100 TABLET, FILM COATED ORAL DAILY
Qty: 90 TABLET | Refills: 1 | Status: CANCELLED | OUTPATIENT
Start: 2023-01-15

## 2023-01-16 RX ORDER — SERTRALINE HYDROCHLORIDE 150 MG/1
150 CAPSULE ORAL DAILY
Qty: 90 CAPSULE | Refills: 1 | Status: SHIPPED | OUTPATIENT
Start: 2023-01-16

## 2023-01-16 NOTE — TELEPHONE ENCOUNTER
LOV: 11/29/22    RTC: 3 Months    FU: No FU Appt Scheduled    Last Refill: 10/17/22    3 Month Supply Pending       [facility] sent

## 2023-01-20 RX ORDER — GLIPIZIDE 10 MG/1
TABLET, FILM COATED, EXTENDED RELEASE ORAL
Qty: 90 TABLET | Refills: 0 | Status: SHIPPED | OUTPATIENT
Start: 2023-01-20

## 2023-01-23 NOTE — TELEPHONE ENCOUNTER
Received call from pharmacist stating that Sertraline 150 in not covered on patient's formulary. 100 mg is covered. Per pharmacist, patient is fine with cutting pill in half to take the full 150 mg. Please advise.

## 2023-01-25 RX ORDER — SERTRALINE HYDROCHLORIDE 100 MG/1
150 TABLET, FILM COATED ORAL DAILY
Qty: 135 TABLET | Refills: 3 | Status: SHIPPED | OUTPATIENT
Start: 2023-01-25 | End: 2023-04-25

## 2023-01-25 NOTE — TELEPHONE ENCOUNTER
Pharmacy calling for an update on decision, patient can take 1 tab 1/2 of the 100, patient is agreeable to this and is covered by INS and or we can Iniate a PA for the 150mg.

## 2023-02-01 ENCOUNTER — HOSPITAL ENCOUNTER (OUTPATIENT)
Dept: ENDOCRINOLOGY | Facility: HOSPITAL | Age: 38
Discharge: HOME | End: 2023-02-01
Attending: INTERNAL MEDICINE
Payer: COMMERCIAL

## 2023-02-01 DIAGNOSIS — E11.65 TYPE 2 DIABETES MELLITUS WITH HYPERGLYCEMIA, WITHOUT LONG-TERM CURRENT USE OF INSULIN (HCC): Primary | ICD-10-CM

## 2023-02-01 NOTE — PATIENT INSTRUCTIONS
Aim for myplate at meal - aim for balance (veg, protein, carb) at B/L/D  Set times for meals/snacks and eating at table with portions poured into bowls/plates -- limit grazing, keep food out of site/in cabinets  Alarm clocks for meals - set today  Call Roxanne Canseco [phone], requested by patient due to prior referral to intensive OP and unable to get to program

## 2023-02-02 ENCOUNTER — TELEPHONE (OUTPATIENT)
Dept: ENDOCRINOLOGY CLINIC | Facility: CLINIC | Age: 38
End: 2023-02-02

## 2023-02-26 RX ORDER — GLIPIZIDE 5 MG/1
TABLET, FILM COATED, EXTENDED RELEASE ORAL
Qty: 90 TABLET | Refills: 0 | Status: SHIPPED | OUTPATIENT
Start: 2023-02-26

## 2023-03-10 NOTE — TELEPHONE ENCOUNTER
Please review; protocol failed/no protocol. Requested Prescriptions   Pending Prescriptions Disp Refills    SITagliptin Phosphate (JANUVIA) 100 MG Oral Tab 90 tablet 1     Sig: Take 1 tablet (100 mg total) by mouth daily.        Diabetes Medication Protocol Failed - 3/9/2023  9:56 AM        Failed - Last A1C < 7.5 and within past 6 months     Lab Results   Component Value Date    A1C 9.0% 11/29/2022             Failed - EGFRCR or GFRNAA > 50     GFR Evaluation            Failed - GFR in the past 12 months        Passed - In person appointment or virtual visit in the past 6 mos or appointment in next 3 mos     Recent Outpatient Visits              3 months ago Type 2 diabetes mellitus with hyperglycemia, without long-term current use of insulin [facility]    Yamileth Evangelista MD    Office Visit    4 months ago Generalized anxiety disorder    [facility], [address], [facility] Gauarv Ross MD    Telemedicine    7 months ago Major depressive disorder, recurrent episode, moderate [facility])    [address], [address], [place]    Telemedicine    10 months ago Irregular menstruation    [address], [address], Luke Rao MD    Telemedicine    1 year ago Type 2 diabetes mellitus without complication, without long-term current use of insulin [facility])    [address], [address], Luke Rao MD    Office Visit          Future Appointments         Provider Department Appt Notes    In 5 days Elvina Nissen, MD [address], [address]                      Recent Outpatient Visits              3 months ago Type 2 diabetes mellitus with hyperglycemia, without long-term current use of insulin [facility])    Yamileth Evangelista MD    Office Visit    4 months ago Generalized anxiety disorder    [facility], [facility], [facility] 183 Samira Stoner MD    Telemedicine    7 months ago Major depressive disorder, recurrent episode, moderate [facility])    [address], [facility] 86, [facility] [address] Samira Stoner MD    Telemedicine    10 months ago Irregular menstruation    Nila Ponce MD    Telemedicine    1 year ago Type 2 diabetes mellitus without complication, without long-term current use of insulin [facility])    [address], [facility] 86, [facility] [address] Samira Stoner MD    Office Visit             Future Appointments         Provider Department Appt Notes    In 5 days Robi Larsen MD [address], [place] [address]

## 2023-03-14 ENCOUNTER — OFFICE VISIT (OUTPATIENT)
Dept: ENDOCRINOLOGY CLINIC | Facility: CLINIC | Age: 38
End: 2023-03-14

## 2023-03-14 ENCOUNTER — LAB ENCOUNTER (OUTPATIENT)
Dept: LAB | Facility: HOSPITAL | Age: 38
End: 2023-03-14
Attending: FAMILY MEDICINE
Payer: COMMERCIAL

## 2023-03-14 VITALS
SYSTOLIC BLOOD PRESSURE: 143 MMHG | BODY MASS INDEX: 37 KG/M2 | WEIGHT: 258.81 LBS | HEART RATE: 90 BPM | DIASTOLIC BLOOD PRESSURE: 89 MMHG

## 2023-03-14 DIAGNOSIS — E11.65 TYPE 2 DIABETES MELLITUS WITH HYPERGLYCEMIA, UNSPECIFIED WHETHER LONG TERM INSULIN USE (HCC): Primary | ICD-10-CM

## 2023-03-14 DIAGNOSIS — E11.9 TYPE 2 DIABETES MELLITUS WITHOUT COMPLICATION, WITHOUT LONG-TERM CURRENT USE OF INSULIN (HCC): ICD-10-CM

## 2023-03-14 DIAGNOSIS — E78.5 DYSLIPIDEMIA: ICD-10-CM

## 2023-03-14 PROBLEM — E66.01 MORBID (SEVERE) OBESITY DUE TO EXCESS CALORIES (HCC): Status: ACTIVE | Noted: 2023-03-14

## 2023-03-14 LAB
ALBUMIN SERPL-MCNC: 4.2 G/DL (ref 3.4–5)
ALBUMIN/GLOB SERPL: 1.1 {RATIO} (ref 1–2)
ALP LIVER SERPL-CCNC: 65 U/L
ALT SERPL-CCNC: 42 U/L
ANION GAP SERPL CALC-SCNC: 8 MMOL/L (ref 0–18)
AST SERPL-CCNC: 32 U/L (ref 15–37)
BILIRUB SERPL-MCNC: 0.6 MG/DL (ref 0.1–2)
BUN BLD-MCNC: 10 MG/DL (ref 7–18)
BUN/CREAT SERPL: 18.2 (ref 10–20)
CALCIUM BLD-MCNC: 9.5 MG/DL (ref 8.5–10.1)
CHLORIDE SERPL-SCNC: 104 MMOL/L (ref 98–112)
CHOLEST SERPL-MCNC: 199 MG/DL (ref ?–200)
CO2 SERPL-SCNC: 27 MMOL/L (ref 21–32)
CREAT BLD-MCNC: 0.55 MG/DL
EST. AVERAGE GLUCOSE BLD GHB EST-MCNC: 214 MG/DL (ref 68–126)
FASTING PATIENT LIPID ANSWER: YES
FASTING STATUS PATIENT QL REPORTED: YES
GFR SERPLBLD BASED ON 1.73 SQ M-ARVRAT: 120 ML/MIN/1.73M2 (ref 60–?)
GLOBULIN PLAS-MCNC: 3.7 G/DL (ref 2.8–4.4)
GLUCOSE BLD-MCNC: 225 MG/DL (ref 70–99)
GLUCOSE BLOOD: 251
HBA1C MFR BLD: 9.1 % (ref ?–5.7)
HDLC SERPL-MCNC: 51 MG/DL (ref 40–59)
LDLC SERPL CALC-MCNC: 122 MG/DL (ref ?–100)
NONHDLC SERPL-MCNC: 148 MG/DL (ref ?–130)
OSMOLALITY SERPL CALC.SUM OF ELEC: 294 MOSM/KG (ref 275–295)
POTASSIUM SERPL-SCNC: 4.8 MMOL/L (ref 3.5–5.1)
PROT SERPL-MCNC: 7.9 G/DL (ref 6.4–8.2)
SODIUM SERPL-SCNC: 139 MMOL/L (ref 136–145)
TEST STRIP LOT #: NORMAL NUMERIC
TRIGL SERPL-MCNC: 146 MG/DL (ref 30–149)
VLDLC SERPL CALC-MCNC: 26 MG/DL (ref 0–30)

## 2023-03-14 PROCEDURE — 82947 ASSAY GLUCOSE BLOOD QUANT: CPT | Performed by: INTERNAL MEDICINE

## 2023-03-14 PROCEDURE — 3077F SYST BP >= 140 MM HG: CPT | Performed by: INTERNAL MEDICINE

## 2023-03-14 PROCEDURE — 3046F HEMOGLOBIN A1C LEVEL >9.0%: CPT | Performed by: INTERNAL MEDICINE

## 2023-03-14 PROCEDURE — 3079F DIAST BP 80-89 MM HG: CPT | Performed by: INTERNAL MEDICINE

## 2023-03-14 PROCEDURE — 83036 HEMOGLOBIN GLYCOSYLATED A1C: CPT

## 2023-03-14 PROCEDURE — 80053 COMPREHEN METABOLIC PANEL: CPT

## 2023-03-14 PROCEDURE — 36415 COLL VENOUS BLD VENIPUNCTURE: CPT

## 2023-03-14 PROCEDURE — 99214 OFFICE O/P EST MOD 30 MIN: CPT | Performed by: INTERNAL MEDICINE

## 2023-03-14 PROCEDURE — 80061 LIPID PANEL: CPT

## 2023-03-14 RX ORDER — DULOXETIN HYDROCHLORIDE 30 MG/1
30 CAPSULE, DELAYED RELEASE ORAL DAILY
COMMUNITY
End: 2023-03-16

## 2023-03-14 RX ORDER — ATORVASTATIN CALCIUM 20 MG/1
20 TABLET, FILM COATED ORAL NIGHTLY
Qty: 90 TABLET | Refills: 0 | Status: SHIPPED | OUTPATIENT
Start: 2023-03-14

## 2023-03-14 RX ORDER — SERTRALINE HYDROCHLORIDE 100 MG/1
100 TABLET, FILM COATED ORAL DAILY
COMMUNITY

## 2023-03-14 RX ORDER — GLIPIZIDE 10 MG/1
20 TABLET, FILM COATED, EXTENDED RELEASE ORAL DAILY
Qty: 180 TABLET | Refills: 0 | Status: SHIPPED | OUTPATIENT
Start: 2023-03-14

## 2023-04-03 ENCOUNTER — TELEPHONE (OUTPATIENT)
Dept: ENDOCRINOLOGY CLINIC | Facility: CLINIC | Age: 38
End: 2023-04-03

## 2023-04-03 ENCOUNTER — HOSPITAL ENCOUNTER (OUTPATIENT)
Dept: ENDOCRINOLOGY | Facility: HOSPITAL | Age: 38
End: 2023-04-03
Attending: INTERNAL MEDICINE
Payer: COMMERCIAL

## 2023-04-03 DIAGNOSIS — E11.65 TYPE 2 DIABETES MELLITUS WITH HYPERGLYCEMIA, WITHOUT LONG-TERM CURRENT USE OF INSULIN (HCC): Primary | ICD-10-CM

## 2023-04-03 PROCEDURE — 97803 MED NUTRITION INDIV SUBSEQ: CPT

## 2023-04-03 NOTE — PATIENT INSTRUCTIONS
Make lunch in the morning  Try to double batch cook dinners on nights when  is home  Walking with michael calie or at locations 5 min 3-5 days per week   Continue with 3 meals, 3 snacks - try to add more balance at meals, myplate (1/2 plate veg + pro + carb)

## 2023-04-03 NOTE — TELEPHONE ENCOUNTER
Received letter from [facility] intended to inform provider regarding patient's multiple health disparities. Placed in Dr. Nancy Moya folder for review.

## 2023-04-17 RX ORDER — GLIPIZIDE 10 MG/1
TABLET, FILM COATED, EXTENDED RELEASE ORAL
Qty: 90 TABLET | Refills: 0 | OUTPATIENT
Start: 2023-04-17

## 2023-04-17 NOTE — TELEPHONE ENCOUNTER
LOV: 3/14/23  Last refill: 3/14/23  RTC[de-identified] 6/14/23  HgbA1c: 9.1 on 3/14/23    90 day refill given on 3/14/23

## 2023-05-09 ENCOUNTER — OFFICE VISIT (OUTPATIENT)
Dept: FAMILY MEDICINE CLINIC | Facility: CLINIC | Age: 38
End: 2023-05-09

## 2023-05-09 VITALS
RESPIRATION RATE: 17 BRPM | DIASTOLIC BLOOD PRESSURE: 87 MMHG | HEIGHT: 70 IN | WEIGHT: 258 LBS | SYSTOLIC BLOOD PRESSURE: 123 MMHG | OXYGEN SATURATION: 98 % | HEART RATE: 78 BPM | BODY MASS INDEX: 36.94 KG/M2

## 2023-05-09 DIAGNOSIS — Z01.419 WELL WOMAN EXAM WITH ROUTINE GYNECOLOGICAL EXAM: Primary | ICD-10-CM

## 2023-05-09 DIAGNOSIS — E11.65 TYPE 2 DIABETES MELLITUS WITH HYPERGLYCEMIA, WITHOUT LONG-TERM CURRENT USE OF INSULIN (HCC): ICD-10-CM

## 2023-05-09 DIAGNOSIS — F33.1 MAJOR DEPRESSIVE DISORDER, RECURRENT EPISODE, MODERATE (HCC): ICD-10-CM

## 2023-05-09 DIAGNOSIS — F41.1 GENERALIZED ANXIETY DISORDER: ICD-10-CM

## 2023-05-09 PROCEDURE — 3079F DIAST BP 80-89 MM HG: CPT | Performed by: FAMILY MEDICINE

## 2023-05-09 PROCEDURE — 99395 PREV VISIT EST AGE 18-39: CPT | Performed by: FAMILY MEDICINE

## 2023-05-09 PROCEDURE — 90471 IMMUNIZATION ADMIN: CPT | Performed by: FAMILY MEDICINE

## 2023-05-09 PROCEDURE — 3008F BODY MASS INDEX DOCD: CPT | Performed by: FAMILY MEDICINE

## 2023-05-09 PROCEDURE — 90677 PCV20 VACCINE IM: CPT | Performed by: FAMILY MEDICINE

## 2023-05-09 PROCEDURE — 3074F SYST BP LT 130 MM HG: CPT | Performed by: FAMILY MEDICINE

## 2023-05-09 RX ORDER — ALBUTEROL SULFATE 90 UG/1
2 AEROSOL, METERED RESPIRATORY (INHALATION) EVERY 4 HOURS PRN
Qty: 18 G | Refills: 0 | Status: SHIPPED | OUTPATIENT
Start: 2023-05-09

## 2023-05-09 RX ORDER — AZELASTINE 1 MG/ML
2 SPRAY, METERED NASAL 2 TIMES DAILY
Qty: 3 EACH | Refills: 1 | Status: SHIPPED | OUTPATIENT
Start: 2023-05-09

## 2023-05-15 LAB — HPV I/H RISK 1 DNA SPEC QL NAA+PROBE: NEGATIVE

## 2023-06-11 RX ORDER — ATORVASTATIN CALCIUM 20 MG/1
TABLET, FILM COATED ORAL
Qty: 90 TABLET | Refills: 0 | Status: SHIPPED | OUTPATIENT
Start: 2023-06-11

## 2023-06-11 RX ORDER — GLIPIZIDE 10 MG/1
TABLET, FILM COATED, EXTENDED RELEASE ORAL
Qty: 180 TABLET | Refills: 0 | Status: SHIPPED | OUTPATIENT
Start: 2023-06-11

## 2023-09-07 RX ORDER — ATORVASTATIN CALCIUM 20 MG/1
TABLET, FILM COATED ORAL
Qty: 90 TABLET | Refills: 0 | Status: SHIPPED | OUTPATIENT
Start: 2023-09-07

## 2023-09-07 RX ORDER — GLIPIZIDE 10 MG/1
TABLET, FILM COATED, EXTENDED RELEASE ORAL
Qty: 180 TABLET | Refills: 0 | Status: SHIPPED | OUTPATIENT
Start: 2023-09-07 | End: 2023-09-12

## 2023-09-12 ENCOUNTER — TELEMEDICINE (OUTPATIENT)
Dept: ENDOCRINOLOGY CLINIC | Facility: CLINIC | Age: 38
End: 2023-09-12

## 2023-09-12 ENCOUNTER — TELEPHONE (OUTPATIENT)
Dept: ENDOCRINOLOGY CLINIC | Facility: CLINIC | Age: 38
End: 2023-09-12

## 2023-09-12 DIAGNOSIS — E11.65 TYPE 2 DIABETES MELLITUS WITH HYPERGLYCEMIA, WITHOUT LONG-TERM CURRENT USE OF INSULIN (HCC): Primary | ICD-10-CM

## 2023-09-12 DIAGNOSIS — Z13.29 THYROID DISORDER SCREENING: ICD-10-CM

## 2023-09-12 DIAGNOSIS — E78.5 DYSLIPIDEMIA: ICD-10-CM

## 2023-09-12 PROCEDURE — 99213 OFFICE O/P EST LOW 20 MIN: CPT | Performed by: INTERNAL MEDICINE

## 2023-09-12 RX ORDER — BLOOD-GLUCOSE METER
1 EACH MISCELLANEOUS DAILY
Qty: 1 KIT | Refills: 0 | Status: SHIPPED | OUTPATIENT
Start: 2023-09-12

## 2023-09-12 RX ORDER — BLOOD SUGAR DIAGNOSTIC
STRIP MISCELLANEOUS
Qty: 100 STRIP | Refills: 0 | Status: SHIPPED | OUTPATIENT
Start: 2023-09-12

## 2023-09-12 RX ORDER — GLIPIZIDE 10 MG/1
TABLET, FILM COATED, EXTENDED RELEASE ORAL
Qty: 180 TABLET | Refills: 0 | Status: SHIPPED | OUTPATIENT
Start: 2023-09-12

## 2023-09-12 RX ORDER — LANCETS 33 GAUGE
1 EACH MISCELLANEOUS DAILY
Qty: 100 EACH | Refills: 0 | Status: SHIPPED | OUTPATIENT
Start: 2023-09-12

## 2023-10-20 RX ORDER — AZELASTINE HYDROCHLORIDE 137 UG/1
2 SPRAY, METERED NASAL 2 TIMES DAILY
Qty: 1 EACH | Refills: 1 | Status: SHIPPED | OUTPATIENT
Start: 2023-10-20 | End: 2023-12-18

## 2023-10-20 NOTE — TELEPHONE ENCOUNTER
Refill passed per [facility] protocol.     Requested Prescriptions   Pending Prescriptions Disp Refills    AZELASTINE  MCG/SPRAY Nasal Solution [Pharmacy Med Name: AZELASTINE 0.1% (137 MCG) SPRY]  1     Sig: SPRAY 2 SPRAYS BY NASAL ROUTE TWICE A DAY       Allergy Medication Protocol Passed - 10/20/2023  2:02 AM        Passed - In person appointment or virtual visit in the past 12 mos or appointment in next 3 mos     Recent Outpatient Visits              1 month ago Type 2 diabetes mellitus with hyperglycemia, without long-term current use of insulin ([facility])    [facility] Wendy Harris MD    Telemedicine    5 months ago Well woman exam with routine gynecological exam    [facility]    Office Visit    7 months ago Type 2 diabetes mellitus with hyperglycemia, unspecified whether long term insulin use ([facility])    [facility]    Office Visit    10 months ago Type 2 diabetes mellitus with hyperglycemia, without long-term current use of insulin ([facility])    [facility] Wendy Harris MD    Office Visit    11 months ago Generalized anxiety disorder    [facility] Geoffrey Corea MD    Telemedicine                            Recent Outpatient Visits              1 month ago Type 2 diabetes mellitus with hyperglycemia, without long-term current use of insulin ([facility])    [facility]    Telemedicine    5 months ago Well woman exam with routine gynecological exam    [facility] Geoffrey Corea MD    Office Visit    7 months ago Type 2 diabetes mellitus with hyperglycemia, unspecified whether long term insulin use ([facility])    [facility]  [address], Wendy Harris MD    Office Visit    10 months ago Type 2 diabetes mellitus with hyperglycemia, without long-term current use of insulin (H[facility])    [facility], Wendy Harris MD    Office Visit    11 months ago Generalized anxiety disorder    [facility] Geoffrey Corea MD    Telemedicine

## 2023-12-11 ENCOUNTER — PATIENT MESSAGE (OUTPATIENT)
Dept: ENDOCRINOLOGY CLINIC | Facility: CLINIC | Age: 38
End: 2023-12-11

## 2023-12-11 RX ORDER — LANCETS 33 GAUGE
1 EACH MISCELLANEOUS DAILY
Qty: 100 EACH | Refills: 0 | Status: SHIPPED | OUTPATIENT
Start: 2023-12-11

## 2023-12-18 RX ORDER — AZELASTINE HYDROCHLORIDE 137 UG/1
2 SPRAY, METERED NASAL 2 TIMES DAILY
Qty: 1 EACH | Refills: 1 | Status: SHIPPED | OUTPATIENT
Start: 2023-12-18

## 2023-12-19 NOTE — TELEPHONE ENCOUNTER
Refill passed per [facility] protocol   Requested Prescriptions   Pending Prescriptions Disp Refills    AZELASTINE  MCG/SPRAY Nasal Solution [Pharmacy Med Name: AZELASTINE 0.1% (137 MCG) SPRY]  1     Sig: SPRAY 2 SPRAYS BY NASAL ROUTE TWICE A DAY       Allergy Medication Protocol Passed - 12/18/2023 12:43 AM        Passed - In person appointment or virtual visit in the past 12 mos or appointment in next 3 mos     Recent Outpatient Visits              3 months ago Type 2 diabetes mellitus with hyperglycemia, without long-term current use of insulin ([facility]    [address], [facility], Kevin Nance MD    Telemedicine    7 months ago Well woman exam with routine gynecological exam    [address], Blanka Gonzalez, Silas Mcgill MD    Office Visit    9 months ago Type 2 diabetes mellitus with hyperglycemia, unspecified whether long term insulin use [facility])    [address], Rodger Prescott MD    Office Visit    1 year ago Type 2 diabetes mellitus with hyperglycemia, without long-term current use of insulin [facility])    Shirlene Yu MD    Office Visit    1 year ago Generalized anxiety disorder    [address], Blanka Gonzalez, Silas Mcgill MD    Telemedicine

## 2023-12-22 RX ORDER — ATORVASTATIN CALCIUM 20 MG/1
20 TABLET, FILM COATED ORAL NIGHTLY
Qty: 90 TABLET | Refills: 0 | Status: SHIPPED | OUTPATIENT
Start: 2023-12-22

## 2024-02-12 RX ORDER — GLIPIZIDE 10 MG/1
TABLET, FILM COATED, EXTENDED RELEASE ORAL
Qty: 180 TABLET | Refills: 0 | Status: SHIPPED | OUTPATIENT
Start: 2024-02-12

## 2024-02-23 ENCOUNTER — PATIENT MESSAGE (OUTPATIENT)
Dept: FAMILY MEDICINE CLINIC | Facility: CLINIC | Age: 39
End: 2024-02-23

## 2024-02-23 NOTE — TELEPHONE ENCOUNTER
From: Trina Baires  To: Geoffrey BoyerAnmol  Sent: 2/23/2024 11:05 AM CST  Subject: Urgent Care    Hi,    I went to urgent care because yesterday I woke up and couldn’t put pressure on my left foot. They said it was a sprain but I didn’t do anything to sprain my ankle. I am concerned that my fibromyalgia is getting worse. I will attach the notes urgent care gave me. I currently have a brace on and it helps a tiny bit and taking Advil!    This morning it was the same. I have a newish job.. that has me running, walking, and standing on my feet 8+ hours a day. Lifting or pulling up to 250lbs. So I worried!

## 2024-02-26 ENCOUNTER — OFFICE VISIT (OUTPATIENT)
Dept: FAMILY MEDICINE CLINIC | Facility: CLINIC | Age: 39
End: 2024-02-26
Payer: COMMERCIAL

## 2024-02-26 ENCOUNTER — HOSPITAL ENCOUNTER (OUTPATIENT)
Dept: GENERAL RADIOLOGY | Age: 39
Discharge: HOME | End: 2024-02-26
Attending: FAMILY MEDICINE
Payer: COMMERCIAL

## 2024-02-26 VITALS
HEART RATE: 86 BPM | WEIGHT: 250 LBS | SYSTOLIC BLOOD PRESSURE: 138 MMHG | DIASTOLIC BLOOD PRESSURE: 86 MMHG | BODY MASS INDEX: 36 KG/M2

## 2024-02-26 DIAGNOSIS — Z12.31 BREAST CANCER SCREENING BY MAMMOGRAM: ICD-10-CM

## 2024-02-26 DIAGNOSIS — M25.572 ACUTE LEFT ANKLE PAIN: ICD-10-CM

## 2024-02-26 DIAGNOSIS — M79.7 FIBROMYALGIA: ICD-10-CM

## 2024-02-26 DIAGNOSIS — I10 ESSENTIAL HYPERTENSION: ICD-10-CM

## 2024-02-26 DIAGNOSIS — E11.65 TYPE 2 DIABETES MELLITUS WITH HYPERGLYCEMIA, WITHOUT LONG-TERM CURRENT USE OF INSULIN (HCC): Primary | ICD-10-CM

## 2024-02-26 PROCEDURE — 99214 OFFICE O/P EST MOD 30 MIN: CPT | Performed by: FAMILY MEDICINE

## 2024-02-26 PROCEDURE — 73630 X-RAY EXAM OF FOOT: CPT | Performed by: FAMILY MEDICINE

## 2024-02-26 PROCEDURE — 73610 X-RAY EXAM OF ANKLE: CPT | Performed by: FAMILY MEDICINE

## 2024-02-26 RX ORDER — DULOXETIN HYDROCHLORIDE 30 MG/1
30 CAPSULE, DELAYED RELEASE ORAL DAILY
COMMUNITY
Start: 2024-02-13

## 2024-02-26 RX ORDER — AMLODIPINE BESYLATE 5 MG/1
5 TABLET ORAL DAILY
Qty: 90 TABLET | Refills: 0 | Status: SHIPPED | OUTPATIENT
Start: 2024-02-26

## 2024-02-26 NOTE — H&P
HPI:    Trina Baires is a 38 year old female presents to clinic for follow-up.  Last week patient developed sudden onset left foot/ankle pain.  Started experiencing sudden onset swelling.  Was unable to bear weight.  Was seen at urgent care-diagnosed with a sprain.  Denies known trauma or injury.  Was given an ankle brace, symptoms have not improved.  History of type 2 diabetes/fibromyalgia/anxiety.  Feels her health is poor right now, all conditions are not well-controlled.      HISTORY:  Past Medical History:   Diagnosis Date    Anxiety     Asthma (HCC)     Depression     Diabetes (HCC)     Fibromyalgia     Fibromyalgia     IBS (irritable bowel syndrome)       Past Surgical History:   Procedure Laterality Date    CHOLECYSTECTOMY        Family History   Problem Relation Age of Onset    Alcohol and Other Disorders Associated Father     Diabetes Father     Depression Mother     Anxiety Mother     Diabetes Mother     Hypertension Mother     Lipids Mother     Diabetes Maternal Grandmother     Lipids Maternal Grandmother     Diabetes Maternal Aunt     Diabetes Paternal Aunt       Social History:   Social History     Socioeconomic History    Marital status:    Tobacco Use    Smoking status: Never    Smokeless tobacco: Never   Vaping Use    Vaping Use: Never used   Substance and Sexual Activity    Alcohol use: No    Drug use: No    Sexual activity: Not Currently        Medications (Active prior to today's visit):  Current Outpatient Medications   Medication Sig Dispense Refill    DULoxetine 30 MG Oral Cap DR Particles Take 1 capsule (30 mg total) by mouth daily.      amLODIPine 5 MG Oral Tab Take 1 tablet (5 mg total) by mouth daily. 90 tablet 0    glipiZIDE ER 10 MG Oral Tablet 24 Hr TAKE 2 TABLETS (20MG TOTAL) BY MOUTH EVERY  tablet 0    atorvastatin 20 MG Oral Tab Take 1 tablet (20 mg total) by mouth nightly. 90 tablet 0    Azelastine HCl 137 MCG/SPRAY Nasal Solution 2 sprays by Nasal  route 2 (two) times daily. 1 each 1    Lancets (ONETOUCH DELICA PLUS [id number]) Does not apply Misc 1 strip by In Vitro route daily. 100 each 0    Glucose Blood (ONETOUCH VERIO) In Vitro Strip Check sugars once a day 100 strip 0    Blood Glucose Monitoring Suppl (ONETOUCH VERIO FLEX SYSTEM) w/Device Does not apply Kit 1 each daily. 1 kit 0    metFORMIN HCl 1000 MG Oral Tab Take 1 tablet (1,000 mg total) by mouth 2 (two) times daily with meals. 180 tablet 0    albuterol 108 (90 Base) MCG/ACT Inhalation Aero Soln Inhale 2 puffs into the lungs every 4 (four) hours as needed for Wheezing. 18 g 3    DULoxetine 60 MG Oral Cap DR Particles Take 1 capsule (60 mg total) by mouth daily. 30 capsule 0       Allergies:  Allergies   Allergen Reactions    Amoxicillin SWELLING    Dust     Pollen     Smoke          Depression Screening (PHQ-2/PHQ-9): Over the LAST 2 WEEKS   Little interest or pleasure in doing things: Several days (Has therapist and psychiatrist)    Feeling down, depressed, or hopeless: Several days    PHQ-2 SCORE: 2   1. Little interest or pleasure in doing things: Several days (Has therapist and psychiatrist)  2. Feeling down, depressed, or hopeless: Several days  3.    4.    5.    6.    7.    8.    9.                 ROS:   Review of Systems   All other systems reviewed and are negative.      PHYSICAL EXAM:     Vitals:    02/26/24 1334 02/26/24 1354   BP: (!) 154/102 138/86   BP Location: Right arm    Patient Position: Sitting    Cuff Size: large    Pulse: 86    Weight: 250 lb (113.4 kg)      Physical Exam  Vitals reviewed.   Constitutional:       General: She is not in acute distress.  Cardiovascular:      Rate and Rhythm: Normal rate and regular rhythm.      Heart sounds: Normal heart sounds.   Pulmonary:      Effort: Pulmonary effort is normal. No respiratory distress.      Breath sounds: Normal breath sounds. No stridor.   Musculoskeletal:      Comments: Left ankle/foot-no swelling or deformity,+ tenderness  near heel.  Antalgic gait.   Neurological:      Mental Status: She is alert.         ASSESSMENT/PLAN:   (E11.65) Type 2 diabetes mellitus with hyperglycemia, without long-term current use of insulin (HCC)  (primary encounter diagnosis)  Plan: Diabetic Retinopathy Exam  OU - Both Eyes  -Diabetic diet encouraged.  Needs labs drawn for endocrinology, will await results.  Needs to continue statin.    (I10) Essential hypertension  Plan:   -Blood pressure initially elevated, improved on second reading.  Occasional headaches.  Amlodipine 5 mg started.  Low-salt diet encouraged.    (M25.572) Acute left ankle pain  Plan:   - XR foot/ankle ordered. Will await results     (M79.7) Fibromyalgia  Plan: Rheumatology Referral - In Network  -Worsening symptoms.  Rheumatology referral placed.    (Z12.31) Breast cancer screening by mammogram  Plan: [facility] 2D+3D SCREENING BILAT         (CPT=77067/52757)           Responsible party/patient verbalized understanding of information discussed. No barriers to learning observed.          Orders This Visit:  Orders Placed This Encounter   Procedures    Fluzone Quadrivalent 6mo+ 0.5mL    Diabetic Retinopathy Exam  OU - Both Eyes       Meds This Visit:  Requested Prescriptions     Signed Prescriptions Disp Refills    amLODIPine 5 MG Oral Tab 90 tablet 0     Sig: Take 1 tablet (5 mg total) by mouth daily.       Imaging & Referrals:  INFLUENZA VAC, QUAD, PRSV FREE, 0.5 ML  RHEUMATOLOGY - INTERNAL  [facility] 2D+3D SCREENING BILAT (CPT=77067/20405)       The 21st Century cures Act makes medical notes like these available to patients in the interest of transparency.  However, be advised that this is a medical document.  It is intended as peer to peer communication.  It is written in medical language and may contain abbreviations or verbiage that are unfamiliar.  It may appear blunt or direct.  Medical documents are intended to carry relevant information, facts as evident, and the clinical opinion of  the practitioner.      This note was created by Dragon voice recognition. Errors in content may be related to improper recognition by the system; efforts to review and correct have been done but errors may still exist. Please contact me with any questions.       2/26/2024  Geoffrey Corea MD

## 2024-02-29 ENCOUNTER — PATIENT MESSAGE (OUTPATIENT)
Dept: FAMILY MEDICINE CLINIC | Facility: CLINIC | Age: 39
End: 2024-02-29

## 2024-02-29 NOTE — TELEPHONE ENCOUNTER
Dr. Corea, would you like to add sitting breaks at some time interval?  Or as needed?  Let me know and I'll edit letter.

## 2024-02-29 NOTE — TELEPHONE ENCOUNTER
From: Trina Baires  To: Geoffrey Corea  Sent: 2/29/2024 7:48 AM CST  Subject: Letter    Hello,    Are there recommendations/restrictions for how long I should stand for or when to take breaks? My job is willing to work with me and accommodate me but I need to have it all written out from my doctor even if it’s just until I see my rheumatologist. Any way to add these things to the letter?    Maybe sit down and or rest as needed? 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 My boss said she would allow me to sit down in the rooms if listed in the letter.     Can you add those details to the letter please?

## 2024-03-13 ENCOUNTER — TELEPHONE (OUTPATIENT)
Dept: FAMILY MEDICINE CLINIC | Facility: CLINIC | Age: 39
End: 2024-03-13

## 2024-03-13 NOTE — TELEPHONE ENCOUNTER
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 Patient stated forms are due March 17,2024 so she would like forms to be submitted asap. I did let patient know forms are taking 11 days or so for processing. Patient acknowledge.

## 2024-03-21 ENCOUNTER — PATIENT MESSAGE (OUTPATIENT)
Dept: ENDOCRINOLOGY CLINIC | Facility: CLINIC | Age: 39
End: 2024-03-21

## 2024-03-21 ENCOUNTER — HOSPITAL ENCOUNTER (OUTPATIENT)
Dept: MAMMOGRAPHY | Age: 39
Discharge: HOME | End: 2024-03-21
Attending: FAMILY MEDICINE
Payer: COMMERCIAL

## 2024-03-21 DIAGNOSIS — Z12.31 BREAST CANCER SCREENING BY MAMMOGRAM: ICD-10-CM

## 2024-03-21 PROCEDURE — 77067 SCR MAMMO BI INCL CAD: CPT | Performed by: FAMILY MEDICINE

## 2024-03-21 PROCEDURE — 77063 BREAST TOMOSYNTHESIS BI: CPT | Performed by: FAMILY MEDICINE

## 2024-03-21 NOTE — TELEPHONE ENCOUNTER
Dr. Corea,    Patient is requesting FMLA for Fibromyalgia and Spur in left ankle.   Start: 03/02/24  End: 5/2/24 pending appointment with Rheumatology 4/26/24    Do you support?    Thanks,    Guanakito SAGE

## 2024-03-21 NOTE — TELEPHONE ENCOUNTER
Dr. Corea,    Patient is currently off work and scheduled to return 5/2/24. Pt has appointment with Rheumatology on 4/26/24 and states she might be out longer depending on what she is told at this appointment.

## 2024-03-21 NOTE — TELEPHONE ENCOUNTER
From: Trina Baires  To: Wendy Baird  Sent: 3/21/2024 10:06 AM CDT  Subject: Fasting Blood Work    Hi Dr Baird,    I am wondering if I have a request for fasting test so I can complete it prior to appointment.

## 2024-03-21 NOTE — TELEPHONE ENCOUNTER
Spoke to patient to get addt'l details.      Type of Leave:  FMLA (continuous)   Reason for Leave:  Fibromyalgia  and Spur in left ankle  Start date of leave: 03/02/2024  End  date  of leave: 05/02/2024  How much time needed?:   Forms Due Date:  Was Fee and Turnaround info Given?: YES      Patient w/follow-up with Rheumatology  4/26/24

## 2024-03-25 NOTE — TELEPHONE ENCOUNTER
Please try to avoid signing forms in the corner as it is not visible when printing and forms are not accepted this way. Thank you!     Dr. Corea     *The ACKNOWLEDGE button has been moved to the top right ribbon*    Please sign off on form if you agree to: FMLA Start: 03/3/24    End: 05/2/24 - Acute Left Ankle Pain  (place your signature on the first page only)    -From your Inbasket, Highlight the patient and click Chart   -Double click the 3/13/2024 Forms Completion telephone encounter  -Scroll down to the Media section   -Click the blue Hyperlink: [facility] Dr. Corea  3/25/24  -Click Acknowledge located in the top right ribbon/menu   -Drag the mouse into the blank space of the document and a + sign will appear. Left click to   electronically sign the document.     Thank you,    Guanakito SAGE

## 2024-03-26 NOTE — TELEPHONE ENCOUNTER
FMLA completed and signed by provider. Faxed to   [employer] -  [phone] - waiting for confirmation

## 2024-03-26 NOTE — TELEPHONE ENCOUNTER
FMLA completed and signed by provider. Faxed to   [employer] -  [phone]. Confirmation received

## 2024-04-13 ENCOUNTER — PATIENT MESSAGE (OUTPATIENT)
Dept: ENDOCRINOLOGY CLINIC | Facility: CLINIC | Age: 39
End: 2024-04-13

## 2024-04-13 NOTE — TELEPHONE ENCOUNTER
From: Trina Baires  To: Wendy Baird  Sent: 4/13/2024 11:51 AM CDT  Subject: Reschedule Appointment    Hi Dr Baird,    I am trying to reschedule our appointment as I am sick with a cold currently. The christie is not allowing me to do so for you. Can anyone help?    Trina

## 2024-04-22 RX ORDER — DULOXETIN HYDROCHLORIDE 30 MG/1
30 CAPSULE, DELAYED RELEASE ORAL DAILY
Refills: 0 | OUTPATIENT
Start: 2024-04-22

## 2024-04-26 ENCOUNTER — LAB ENCOUNTER (OUTPATIENT)
Dept: LAB | Facility: HOSPITAL | Age: 39
End: 2024-04-26
Attending: INTERNAL MEDICINE
Payer: COMMERCIAL

## 2024-04-26 ENCOUNTER — HOSPITAL ENCOUNTER (OUTPATIENT)
Dept: GENERAL RADIOLOGY | Facility: HOSPITAL | Age: 39
Discharge: HOME | End: 2024-04-26
Attending: INTERNAL MEDICINE
Payer: COMMERCIAL

## 2024-04-26 ENCOUNTER — OFFICE VISIT (OUTPATIENT)
Dept: RHEUMATOLOGY | Facility: CLINIC | Age: 39
End: 2024-04-26
Payer: COMMERCIAL

## 2024-04-26 VITALS
WEIGHT: 250 LBS | DIASTOLIC BLOOD PRESSURE: 93 MMHG | BODY MASS INDEX: 35.79 KG/M2 | SYSTOLIC BLOOD PRESSURE: 140 MMHG | HEIGHT: 70 IN | HEART RATE: 120 BPM

## 2024-04-26 DIAGNOSIS — M25.50 POLYARTHRALGIA: ICD-10-CM

## 2024-04-26 DIAGNOSIS — M79.7 FIBROMYALGIA: ICD-10-CM

## 2024-04-26 DIAGNOSIS — M25.50 POLYARTHRALGIA: Primary | ICD-10-CM

## 2024-04-26 LAB
CK SERPL-CCNC: 57 U/L
CRP SERPL-MCNC: <0.4 MG/DL (ref ?–1)
ERYTHROCYTE [SEDIMENTATION RATE] IN BLOOD: 19 MM/HR
RHEUMATOID FACT SERPL-ACNC: <10 IU/ML (ref ?–14)

## 2024-04-26 PROCEDURE — 85652 RBC SED RATE AUTOMATED: CPT | Performed by: INTERNAL MEDICINE

## 2024-04-26 PROCEDURE — 36415 COLL VENOUS BLD VENIPUNCTURE: CPT

## 2024-04-26 PROCEDURE — 86038 ANTINUCLEAR ANTIBODIES: CPT | Performed by: INTERNAL MEDICINE

## 2024-04-26 PROCEDURE — 82550 ASSAY OF CK (CPK): CPT

## 2024-04-26 PROCEDURE — 99204 OFFICE O/P NEW MOD 45 MIN: CPT | Performed by: INTERNAL MEDICINE

## 2024-04-26 PROCEDURE — 86140 C-REACTIVE PROTEIN: CPT | Performed by: INTERNAL MEDICINE

## 2024-04-26 PROCEDURE — 73130 X-RAY EXAM OF HAND: CPT | Performed by: INTERNAL MEDICINE

## 2024-04-26 PROCEDURE — 72202 X-RAY EXAM SI JOINTS 3/> VWS: CPT | Performed by: INTERNAL MEDICINE

## 2024-04-26 PROCEDURE — 86431 RHEUMATOID FACTOR QUANT: CPT | Performed by: INTERNAL MEDICINE

## 2024-04-26 PROCEDURE — 86200 CCP ANTIBODY: CPT | Performed by: INTERNAL MEDICINE

## 2024-04-26 PROCEDURE — 81374 HLA I TYPING 1 ANTIGEN LR: CPT

## 2024-04-26 RX ORDER — GABAPENTIN 100 MG/1
300 CAPSULE ORAL 3 TIMES DAILY
Qty: 270 CAPSULE | Refills: 2 | Status: SHIPPED | OUTPATIENT
Start: 2024-04-26

## 2024-04-26 NOTE — PATIENT INSTRUCTIONS
You were seen today for diffuse joint and muscle pain  It does appear likely from fibromyalgia but does get some more blood work and x-rays to rule out anything else  Try gabapentin 100 mg at night for 2 weeks then increase to 300 mg at night.  There is room to go up on this medication  Continue the Cymbalta  See me July 10 at 9 AM
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
## 2024-04-29 LAB
CCP IGG SERPL-ACNC: 1.6 U/ML (ref 0–6.9)
NUCLEAR IGG TITR SER IF: NEGATIVE {TITER}

## 2024-05-06 LAB — HLA-B27: NEGATIVE

## 2024-05-23 PROBLEM — F43.10 PTSD (POST-TRAUMATIC STRESS DISORDER): Status: ACTIVE | Noted: 2024-05-23

## 2024-05-23 PROBLEM — F32.A DEPRESSION: Status: ACTIVE | Noted: 2024-05-23

## 2024-05-23 NOTE — TELEPHONE ENCOUNTER
Please review.  Protocol failed / Has no protocol.     [employer] message sent to patient to complete labs pended from last office visit 9/12/2023.     Requested Prescriptions   Pending Prescriptions Disp Refills    AMLODIPINE 5 MG Oral Tab [Pharmacy Med Name: AMLODIPINE BESYLATE 5 MG TAB] 90 tablet 0     Sig: Take 1 tablet (5 mg total) by mouth daily.       Hypertension Medications Protocol Failed - 5/21/2024 11:31 AM        Failed - CMP or BMP in past 12 months        Failed - Last BP reading less than 140/90     BP Readings from Last 1 Encounters:   04/26/24 (!) 140/93               Failed - EGFRCR or GFRNAA > 50     GFR Evaluation            Passed - In person appointment or virtual visit in the past 12 mos or appointment in next 3 mos     Recent Outpatient Visits              3 weeks ago Polyarthralgia    [facility] Stephanie Molina MD    Office Visit    2 months ago Type 2 diabetes mellitus with hyperglycemia, without long-term current use of insulin ([facility])    [facility] Geoffrey Corea MD    Office Visit    8 months ago Type 2 diabetes mellitus with hyperglycemia, without long-term current use of insulin ([facility])    [facility] Wnedy Baird MD    Telemedicine    1 year ago Well woman exam with routine gynecological exam    [facility] Geoffrey Corea MD    Office Visit    1 year ago Type 2 diabetes mellitus with hyperglycemia, unspecified whether long term insulin use ([facility])    [facility] Wendy Baird MD    Office Visit          Future Appointments         Provider Department Appt Notes    In 1 month [facility] Molina MD [facility] per                        Future Appointments         Provider Department Appt Notes    In 1 month Stephanie Molina,  MD [facility] follow up ok per           Recent Outpatient Visits              3 weeks ago Polyarthralgia    [facility] Stephanie Molina MD    Office Visit    2 months ago Type 2 diabetes mellitus with hyperglycemia, without long-term current use of insulin ([facility])    [facility] Geoffrey Corea MD    Office Visit    8 months ago Type 2 diabetes mellitus with hyperglycemia, without long-term current use of insulin ([facility])    [facility], Wendy Harris MD    Telemedicine    1 year ago Well woman exam with routine gynecological exam    [facility] Geoffrey Corea MD    Office Visit    1 year ago Type 2 diabetes mellitus with hyperglycemia, unspecified whether long term insulin use ([facility])    [facility], Wendy Harris MD    Office Visit

## 2024-05-24 RX ORDER — AMLODIPINE BESYLATE 5 MG/1
5 TABLET ORAL DAILY
Qty: 90 TABLET | Refills: 3 | Status: SHIPPED | OUTPATIENT
Start: 2024-05-24

## 2024-06-05 ENCOUNTER — PATIENT MESSAGE (OUTPATIENT)
Dept: RHEUMATOLOGY | Facility: CLINIC | Age: 39
End: 2024-06-05

## 2024-06-06 NOTE — TELEPHONE ENCOUNTER
From: Trina Baires  To: Stephanie Molina  Sent: 6/5/2024 5:08 PM CDT  Subject: Medicine Update    Hi Dr Molina,    I still have pain daily with the 300mg so if it gets bad enough can I take Tylenol 650mg?

## 2024-06-06 NOTE — TELEPHONE ENCOUNTER
Please see message below and advise. Gabapentin 300 mg nightly then Tylenol 650 mg for worsening pain?      Future Appointments   Date Time Provider Department Center   7/10/2024  9:00 AM [id number]

## 2024-07-16 ENCOUNTER — TELEPHONE (OUTPATIENT)
Dept: INTERNAL MEDICINE CLINIC | Facility: CLINIC | Age: 39
End: 2024-07-16

## 2024-08-15 RX ORDER — GLIPIZIDE 10 MG/1
TABLET, FILM COATED, EXTENDED RELEASE ORAL
Qty: 180 TABLET | Refills: 1 | Status: SHIPPED | OUTPATIENT
Start: 2024-08-15

## 2024-08-15 NOTE — TELEPHONE ENCOUNTER
Endocrine Refill protocol for oral and injectable diabetic medications    Protocol Criteria:  PASSED  -Appointment with Endocrinology completed in the last 6 months or scheduled in the next 3 months    -A1c result below 8.5% in the past 6 months      Verify the above has been completed or scheduled in the appropriate timeline. If so can send a 90 day supply with 1 refill.     Last completed office visit: 9/12/2023 Wendy Baird MD   Next scheduled Follow up: 10/22/24     Last A1c result: Last A1c value was 9.1% done 3/14/2023.

## 2024-08-23 RX ORDER — ATORVASTATIN CALCIUM 20 MG/1
20 TABLET, FILM COATED ORAL NIGHTLY
Qty: 30 TABLET | Refills: 0 | Status: SHIPPED | OUTPATIENT
Start: 2024-08-23 | End: 2024-08-23

## 2024-08-23 RX ORDER — ATORVASTATIN CALCIUM 20 MG/1
20 TABLET, FILM COATED ORAL NIGHTLY
Qty: 90 TABLET | Refills: 0 | Status: SHIPPED | OUTPATIENT
Start: 2024-08-23

## 2024-08-23 NOTE — TELEPHONE ENCOUNTER
Endocrine refill protocol for lipid lowering medications    Protocol Criteria:  PASSED Reason: N/A  Appointment with Endocrinology completed in the last 6 months or scheduled in the next 3 months     Verify appointment has been completed or scheduled in the appropriate timeline. If so can send a 90 day supply with 1 refill.   Lipid panel must have been completed in the last 12 months   ALT result below 80  LDL result below 130    Last completed office visit:9/12/2023 Wendy Baird MD   Next scheduled Follow up: 10/14/24     Last Lipid panel date: Cholesterol: 199, done on 3/14/2023.  HDL Cholesterol: 51, done on 3/14/2023.  TriGlycerides 146, done on 3/14/2023.  LDL Cholesterol: 122, done on 3/14/2023.     Last ALT result: Last ALT was 42 done on 3/14/2023.  Last AST was 32 done on 3/14/2023.

## 2024-08-24 DIAGNOSIS — E11.65 TYPE 2 DIABETES MELLITUS WITH HYPERGLYCEMIA, WITHOUT LONG-TERM CURRENT USE OF INSULIN (HCC): ICD-10-CM

## 2024-08-26 NOTE — TELEPHONE ENCOUNTER
Metformin 1000m day supply sent to [facility] in [place] on 2024

## 2024-09-24 ENCOUNTER — LAB ENCOUNTER (OUTPATIENT)
Dept: LAB | Facility: HOSPITAL | Age: 39
End: 2024-09-24
Attending: INTERNAL MEDICINE
Payer: COMMERCIAL

## 2024-09-24 ENCOUNTER — OFFICE VISIT (OUTPATIENT)
Dept: RHEUMATOLOGY | Facility: CLINIC | Age: 39
End: 2024-09-24
Payer: COMMERCIAL

## 2024-09-24 VITALS
SYSTOLIC BLOOD PRESSURE: 129 MMHG | DIASTOLIC BLOOD PRESSURE: 86 MMHG | WEIGHT: 252 LBS | HEART RATE: 112 BPM | BODY MASS INDEX: 36.08 KG/M2 | HEIGHT: 70 IN

## 2024-09-24 DIAGNOSIS — M79.7 FIBROMYALGIA: ICD-10-CM

## 2024-09-24 DIAGNOSIS — M25.50 POLYARTHRALGIA: Primary | ICD-10-CM

## 2024-09-24 DIAGNOSIS — R21 RASH: ICD-10-CM

## 2024-09-24 DIAGNOSIS — R68.2 DRY MOUTH: ICD-10-CM

## 2024-09-24 PROCEDURE — 86235 NUCLEAR ANTIGEN ANTIBODY: CPT

## 2024-09-24 PROCEDURE — 99214 OFFICE O/P EST MOD 30 MIN: CPT | Performed by: INTERNAL MEDICINE

## 2024-09-24 PROCEDURE — 36415 COLL VENOUS BLD VENIPUNCTURE: CPT

## 2024-09-24 PROCEDURE — 96372 THER/PROPH/DIAG INJ SC/IM: CPT | Performed by: INTERNAL MEDICINE

## 2024-09-24 RX ORDER — TRIAMCINOLONE ACETONIDE 40 MG/ML
40 INJECTION, SUSPENSION INTRA-ARTICULAR; INTRAMUSCULAR ONCE
Status: COMPLETED | OUTPATIENT
Start: 2024-09-24 | End: 2024-09-24

## 2024-09-24 RX ORDER — PREGABALIN 50 MG/1
50 CAPSULE ORAL NIGHTLY
Qty: 30 CAPSULE | Refills: 0 | Status: SHIPPED | OUTPATIENT
Start: 2024-09-24

## 2024-09-24 RX ADMIN — TRIAMCINOLONE ACETONIDE 40 MG: 40 INJECTION, SUSPENSION INTRA-ARTICULAR; INTRAMUSCULAR at 15:30:00

## 2024-09-24 NOTE — PATIENT INSTRUCTIONS
You were seen today for fibromyalgia  Stop gabapentin  Try Lyrica 50 mg nightly to see if it helps some of your pain.  Could increase this medication  In the meantime I gave you a Kenalog injection  Get blood work today  For the tender scalp and rash please see dermatology  Follow-up in 4 months
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
## 2024-09-25 LAB
ENA SS-A IGG SER IA-ACNC: <0.4 U/ML
ENA SS-B IGG SER IA-ACNC: <0.4 U/ML

## 2024-10-03 ENCOUNTER — PATIENT MESSAGE (OUTPATIENT)
Dept: RHEUMATOLOGY | Facility: CLINIC | Age: 39
End: 2024-10-03

## 2024-10-03 RX ORDER — AMITRIPTYLINE HYDROCHLORIDE 10 MG/1
TABLET ORAL NIGHTLY
Qty: 60 TABLET | Refills: 0 | Status: SHIPPED | OUTPATIENT
Start: 2024-10-03

## 2024-10-03 NOTE — TELEPHONE ENCOUNTER
Called patient back.  Having side effects of Lyrica.  She will stop it.  She will try amitriptyline 10 to 20 mg at night to see if that helps

## 2024-10-03 NOTE — TELEPHONE ENCOUNTER
From: Trina Baires  To: Stephanie Molina  Sent: 10/3/2024 12:57 PM CDT  Subject: Lyrica    Hi,    Since starting the new medicine I have had more pain in my neck and migraines. Plus severe muscle spasms every day by 4am in my legs and feet! I would rather be tired on gabapentin.     Trina

## 2024-10-14 ENCOUNTER — OFFICE VISIT (OUTPATIENT)
Dept: ENDOCRINOLOGY CLINIC | Facility: CLINIC | Age: 39
End: 2024-10-14

## 2024-10-14 VITALS
WEIGHT: 251 LBS | BODY MASS INDEX: 35.93 KG/M2 | SYSTOLIC BLOOD PRESSURE: 131 MMHG | DIASTOLIC BLOOD PRESSURE: 92 MMHG | HEART RATE: 96 BPM | HEIGHT: 70 IN

## 2024-10-14 DIAGNOSIS — E78.5 DYSLIPIDEMIA: ICD-10-CM

## 2024-10-14 DIAGNOSIS — E11.65 TYPE 2 DIABETES MELLITUS WITH HYPERGLYCEMIA, WITHOUT LONG-TERM CURRENT USE OF INSULIN (HCC): Primary | ICD-10-CM

## 2024-10-14 LAB
GLUCOSE BLOOD: 411
HEMOGLOBIN A1C: 12.1 % (ref 4.3–5.6)
TEST STRIP LOT #: NORMAL NUMERIC

## 2024-10-14 PROCEDURE — 99214 OFFICE O/P EST MOD 30 MIN: CPT | Performed by: INTERNAL MEDICINE

## 2024-10-14 PROCEDURE — 83036 HEMOGLOBIN GLYCOSYLATED A1C: CPT | Performed by: INTERNAL MEDICINE

## 2024-10-14 PROCEDURE — 82947 ASSAY GLUCOSE BLOOD QUANT: CPT | Performed by: INTERNAL MEDICINE

## 2024-10-14 RX ORDER — ACYCLOVIR 400 MG/1
1 TABLET ORAL
Qty: 9 EACH | Refills: 0 | Status: SHIPPED | OUTPATIENT
Start: 2024-10-14

## 2024-10-14 NOTE — PROGRESS NOTES
Return Office Visit     CHIEF COMPLAINT:  Type 2 DM  Dyslipidemia        HISTORY OF PRESENT ILLNESS:  Trina Baires is a 39 year old female who presents for follow up for DM.      DM HISTORY  Diagnosed: 2015        HISTORY OF DIABETES COMPLICATIONS: :  History of Retinopathy: denies, last eye exam : States she went summer 2024, goes to [facility], no records available Will get records  History of Neuropathy: No  History of Nephropathy: No     ASSOCIATED COMPLICATIONS:   HTN: Denies  Hyperlipidemia: Normal LDL, Slight elevation in TG level.   Coronary Artery Disease:  No  Cerebrovascular Disease: No        HOME GLUCOSE READINGS:   Has not been checking her sugars on a regular basis  She has a meter      CURRENT DIABETIC MEDICATIONS INCLUDE:  MTF 1000 mg pills twice a day  Glipizide ER 20 mg once a day     Reports compliance.    MEALS:  Dietary compliance has not been moderate     EXERCISE:  No         CURRENT MEDICATION:    Current Outpatient Medications   Medication Sig Dispense Refill    semaglutide 2 MG/3ML Subcutaneous Solution Pen-injector Inject 0.25 mg into the skin once a week for 14 days, THEN 0.5 mg once a week. 1 each 12    Continuous Glucose Sensor (DEXCOM G7 SENSOR) Does not apply Misc 1 each Every 10 days. 9 each 0    metFORMIN HCl 1000 MG Oral Tab Take 1 tablet (1,000 mg total) by mouth 2 (two) times daily with meals. 180 tablet 0    GLIPIZIDE ER 10 MG Oral Tablet 24 Hr TAKE 2 TABLETS (20MG TOTAL) BY MOUTH EVERY  tablet 1    amitriptyline 10 MG Oral Tab Take 1-2 tablets (10-20 mg total) by mouth nightly. 60 tablet 0    pregabalin 50 MG Oral Cap Take 1 capsule (50 mg total) by mouth nightly. 30 capsule 0    atorvastatin 20 MG Oral Tab Take 1 tablet (20 mg total) by mouth nightly. 90 tablet 0    amLODIPine 5 MG Oral Tab Take 1 tablet (5 mg total) by mouth daily. 90 tablet 3    DULoxetine 60 MG Oral Cap DR Particles Take 2 capsules (120 mg total) by mouth daily.      Cetirizine  HCl 10 MG Oral Cap Take by mouth.      Acetaminophen (TYLENOL OR) Take by mouth.      Ibuprofen (ADVIL OR) Take by mouth.      Azelastine HCl 137 MCG/SPRAY Nasal Solution 2 sprays by Nasal route 2 (two) times daily. 1 each 1    Lancets (ONETOUCH DELICA PLUS [id number]) Does not apply Misc 1 strip by In Vitro route daily. 100 each 0    Glucose Blood (ONETOUCH VERIO) In Vitro Strip Check sugars once a day 100 strip 0    Blood Glucose Monitoring Suppl (ONETOUCH VERIO FLEX SYSTEM) w/Device Does not apply Kit 1 each daily. 1 kit 0    albuterol 108 (90 Base) MCG/ACT Inhalation Aero Soln Inhale 2 puffs into the lungs every 4 (four) hours as needed for Wheezing. 18 g 3         ALLERGY:  Allergies   Allergen Reactions    Amoxicillin SWELLING    Dust     Pollen     Smoke        PAST MEDICAL, SOCIAL AND FAMILY HISTORY:  See past medical history marked as reviewed.  See past surgical history marked as reviewed.  See past family history marked as reviewed.  See past social history marked as reviewed.    ASSESSMENTS:       REVIEW OF SYSTEMS:  Constitutional: Negative for:  fever, + fatigue, cold/heat intolerance,  weight changes  Eyes: Negative for:  Visual changes, proptosis, blurring  ENT: Negative for:  dysphagia, neck swelling, dysphonia  Respiratory: Negative for:  dyspnea, cough  Cardiovascular: Negative for:  chest pain, palpitations, orthopnea  GI: Negative for:  abdominal pain, nausea, vomiting, diarrhea, constipation, bleeding  Neurology: Negative for: headache, numbness, weakness  Genito-Urinary: Negative for: dysuria, frequency  Psychiatric: Positive for  depression, anxiety, chronic stable  phatics: Negative for: bruising, lower extremity edema  Endocrine: Negative for: polyuria, polydypsia  Skin: Negative for: rash, blister, cellulitis,       PHYSICAL EXAM:     Vitals reviewed    General Appearance:  alert, well developed, in no acute distress  Head: Atraumatic  Eyes:  normal conjunctivae, sclera., normal sclera and  normal pupils  Throat/Neck: normal sound to voice. Normal hearing, normal speech  Respiratory:  Speaking in full sentences, non-labored. no increased work of breathing, no audible wheezing    Neuro: motor grossly intact, moving all extremities without difficulty  Psychiatric:  oriented to time, self, and place  Extremities: Oct 2024  Bilateral barefoot skin diabetic exam is normal, visualized feet and the appearance is normal.  Bilateral monofilament/sensation of both feet is normal.  Pulsation pedal pulse exam of both lower legs/feet is normal as well.        DATA:     Labs reviewed    a1c 12.1 % ( 10/24)      ASSESSMENT AND PLAN:     1. Type 2 DM:       Plan:  Discussed the pathogenesis, natural course of diabetes. Patient understands the importance of glycemic control and the implications of uncontrolled diabetes including Diabetic ketoacidosis and various micro vascular and macrovascular complications.        a). Medications:       We also reviewed the importance of checking sugars.   She has needle phobia.   She will like me to prescribe the CGM again   Dexcom G 7 prescribed  Sample provided       Metformin 1000 mg BID with food  GI SE reviewed     Glipizide ER    20   mg in am only with BF  Discussed risk of hypoglycemia, importance of eating regular meals and checking BG    Start ozmepic with gradual dose tittration as discussed  Pen teaching provided    No personal or family history of MEN syndrome  Patient counselled regarding side effects including injection site reactions, nausea, vomiting, diarrhea, pancreatitis, gastroparesis and rare side effect sera Tushar syndrome.    She will check BG and send them in a week.   Dexcom sample placed. Prescription sent    RTC with CDE for download in two weeks       b). Last Ur MA was high, BG control and repeat   C). Instructed on importance of annual eye exams.   d). Foot exam: Daily feet exam explained  e). BG log maintainence explained in great detail, to get  log and glucometer on next visit.  f). Life style changes discussed  g). Hypoglycemia recognition and management discussed     2. Patient’s BP is normal today  3.Dyslipidemia  A) Discussed lifestyle modifications including reductions in dietary total and saturated fat, weight loss, aerobic exercise, and eating a diet rich in fruits and vegetables.  B) Atorvastatin 20 mg daily. Reviewed SE of muscle cramps   Fasting lipid panel       RTC in 2 months with me and in 2 weeks with CDE  Call with sugars as discussed.          Orders Placed This Encounter   Procedures    POC HemoCue Glucose 201 (Finger stick glucose)    POC Glycohemoglobin [39667]    Comp Metabolic Panel [E]    Lipid Panel [E]    Microalb/Creat Ratio, Random Urine [E]           Wendy Biard MD

## 2024-10-14 NOTE — PROGRESS NOTES
Sample continuous glucose monitor, dexcom g7, placed on patient's right upper arm. Patient understands guidelines and usage of sensor. Patient's sensor connected to clinic and aware of follow up.

## 2024-10-29 RX ORDER — AMITRIPTYLINE HYDROCHLORIDE 10 MG/1
TABLET ORAL NIGHTLY
Qty: 180 TABLET | Refills: 0 | Status: SHIPPED | OUTPATIENT
Start: 2024-10-29

## 2024-10-29 NOTE — TELEPHONE ENCOUNTER
Requested Prescriptions     Pending Prescriptions Disp Refills    amitriptyline 10 MG Oral Tab 60 tablet 0     Sig: Take 1-2 tablets (10-20 mg total) by mouth nightly.     Future Appointments   Date Time Provider Department Center   12/24/2024 10:15 AM Wendy Baird MD [facility]   2/3/2025  1:40 PM Stephanie Molina [facility]       LOV: 9/24/24   Last Refilled:10/3/24 #60 0RF   Labs:  Component      Latest Ref Rng 9/24/2024   Anti-SSA Antibody, IGG      <7 U/mL <0.4    Anti-SSB Antibody, IGG      <7 U/mL <0.4        ASSESSMENT/PLAN:      Fibromyalgia  - She had workup for connective tissue disease and inflammatory arthritis and it was negative  - She on Cymbalta 120 mg daily for anxiety  - She tried gabapentin 100 mg nightly with minimal improvement.  She tried increasing it but caused more drowsiness  - She would like to try Lyrica.  Plan to try Lyrica 50 mg nightly.  Risk/benefits discussed with patient  - Due to her diffuse muscle pain she was given a Kenalog 40 mg IM injection today  - She reports worsening dry eyes and dry mouth.  Will order SSA and SSB     Tender scalp and rash  - Refer to dermatology     Pt will f/u in 4 mos     There is a longitudinal care relationship with me, the care plan reflects the ongoing nature of the continuous relationship of care, and the medical record indicates that there is ongoing treatment of a serious/complex medical condition which I am currently managing.  is Applicable.      Stephanie Molina MD  9/24/2024   3:24 PM                        68

## 2024-12-17 ENCOUNTER — TELEPHONE (OUTPATIENT)
Dept: ENDOCRINOLOGY CLINIC | Facility: CLINIC | Age: 39
End: 2024-12-17

## 2024-12-17 ENCOUNTER — TELEMEDICINE (OUTPATIENT)
Dept: FAMILY MEDICINE CLINIC | Facility: CLINIC | Age: 39
End: 2024-12-17
Payer: COMMERCIAL

## 2024-12-17 DIAGNOSIS — E11.65 TYPE 2 DIABETES MELLITUS WITH HYPERGLYCEMIA, WITHOUT LONG-TERM CURRENT USE OF INSULIN (HCC): ICD-10-CM

## 2024-12-17 DIAGNOSIS — J40 BRONCHITIS: Primary | ICD-10-CM

## 2024-12-17 PROCEDURE — 99214 OFFICE O/P EST MOD 30 MIN: CPT | Performed by: FAMILY MEDICINE

## 2024-12-17 NOTE — PROGRESS NOTES
HPI:    Trina Baires is a 39 year old female clinic for urgent care follow-up.  Over Thanksgiving, patient was diagnosed with bronchitis after experiencing sore throat, fatigue, body aches, and a cough.  States that she recently started to feel better.  Has occasional chest pressure, otherwise asymptomatic.  Denies fevers, chills, nausea, vomiting.  History of type 2 diabetes.  She follows up with endocrinology, missed her last follow-up appointment.  Last A1c 12.1.      HISTORY:  Past Medical History:    Anxiety    Asthma (HCC)    Back pain    Depression    Diabetes (HCC)    Fibromyalgia    Fibromyalgia    Headache disorder    IBS (irritable bowel syndrome)    Obesity      Past Surgical History:   Procedure Laterality Date    Cholecystectomy        Family History   Problem Relation Age of Onset    Obesity Father     Alcohol and Other Disorders Associated Father     Diabetes Father     Obesity Mother     Cancer Mother     Breast Cancer Mother 49    Depression Mother     Anxiety Mother     Diabetes Mother     Hypertension Mother     Alcohol and Other Disorders Associated Maternal Grandmother     Cancer Maternal Grandmother     Obesity Maternal Grandmother     Breast Cancer Maternal Grandmother     Diabetes Maternal Grandmother     Lipids Maternal Grandmother     Alcohol and Other Disorders Associated Maternal Grandfather     Cancer Maternal Grandfather     Obesity Maternal Grandfather     Alcohol and Other Disorders Associated Paternal Grandmother     Obesity Paternal Grandmother     Alcohol and Other Disorders Associated Paternal Grandfather     Cancer Paternal Grandfather     Obesity Paternal Grandfather     Breast Cancer Maternal Aunt         MOM AUNT    Diabetes Maternal Aunt     Diabetes Paternal Aunt     Breast Cancer Maternal Cousin 53    Cancer Maternal Uncle     Diabetes Maternal Uncle     Obesity Maternal Uncle     Alcohol and Other Disorders Associated Maternal Uncle     Heart Disorder  Maternal Uncle     Suicide History Maternal Uncle       Social History:   Social History     Socioeconomic History    Marital status:    Tobacco Use    Smoking status: Never    Smokeless tobacco: Never   Vaping Use    Vaping status: Never Used   Substance and Sexual Activity    Alcohol use: Not Currently    Drug use: Not Currently     Types: Cannabis    Sexual activity: Not Currently     Social Drivers of Health     Financial Resource Strain: Low Risk  (4/24/2024)    Financial Resource Strain     Med Affordability: No   Food Insecurity: No Food Insecurity (4/24/2024)    Food Insecurity     Food Insecurity: Never true   Transportation Needs: No Transportation Needs (4/24/2024)    Transportation Needs     Lack of Transportation: No   Housing Stability: Low Risk  (4/24/2024)    Housing Stability     Housing Instability: No        Medications (Active prior to today's visit):  Current Outpatient Medications   Medication Sig Dispense Refill    amitriptyline 10 MG Oral Tab Take 1-2 tablets (10-20 mg total) by mouth nightly. 180 tablet 0    Continuous Glucose Sensor (DEXCOM G7 SENSOR) Does not apply Misc 1 each Every 10 days. 9 each 0    semaglutide 2 MG/3ML Subcutaneous Solution Pen-injector Inject 0.25 mg into the skin once a week for 14 days, THEN 0.5 mg once a week. 9 mL 0    metFORMIN HCl 1000 MG Oral Tab Take 1 tablet (1,000 mg total) by mouth 2 (two) times daily with meals. 180 tablet 0    pregabalin 50 MG Oral Cap Take 1 capsule (50 mg total) by mouth nightly. 30 capsule 0    atorvastatin 20 MG Oral Tab Take 1 tablet (20 mg total) by mouth nightly. 90 tablet 0    GLIPIZIDE ER 10 MG Oral Tablet 24 Hr TAKE 2 TABLETS (20MG TOTAL) BY MOUTH EVERY  tablet 1    amLODIPine 5 MG Oral Tab Take 1 tablet (5 mg total) by mouth daily. 90 tablet 3    DULoxetine 60 MG Oral Cap DR Particles Take 2 capsules (120 mg total) by mouth daily.      Cetirizine HCl 10 MG Oral Cap Take by mouth.      Acetaminophen (TYLENOL OR)  Take by mouth.      Ibuprofen (ADVIL OR) Take by mouth.      Azelastine HCl 137 MCG/SPRAY Nasal Solution 2 sprays by Nasal route 2 (two) times daily. 1 each 1    Lancets (ONETOUCH DELICA PLUS [id number]) Does not apply Misc 1 strip by In Vitro route daily. 100 each 0    Glucose Blood (ONETOUCH VERIO) In Vitro Strip Check sugars once a day 100 strip 0    Blood Glucose Monitoring Suppl (ONETOUCH VERIO FLEX SYSTEM) w/Device Does not apply Kit 1 each daily. 1 kit 0    albuterol 108 (90 Base) MCG/ACT Inhalation Aero Soln Inhale 2 puffs into the lungs every 4 (four) hours as needed for Wheezing. 18 g 3       Allergies:  Allergies[1]      Depression Screening (PHQ-2/PHQ-9): Over the LAST 2 WEEKS                         ROS:   Review of Systems   All other systems reviewed and are negative.      PHYSICAL EXAM:   There were no vitals filed for this visit.  Physical Exam  Constitutional:       General: She is not in acute distress.  Pulmonary:      Effort: Pulmonary effort is normal. No respiratory distress.   Neurological:      Mental Status: She is alert.   Psychiatric:         Mood and Affect: Mood normal.         ASSESSMENT/PLAN:   (J40) Bronchitis  (primary encounter diagnosis)  Plan:   -Per patient symptoms are improving.  Continue supportive care measures discussed.  Signs/symptoms to warrant urgent care/ER evaluation discussed with patient.  Follow-up as needed.    (E11.65) Type 2 diabetes mellitus with hyperglycemia, without long-term current use of insulin (HCC)  Plan:   -Uncontrolled, states that she has a follow-up appointment in March with endocrinology, unable to be seen sooner.  Diabetic diet strongly encouraged.  Risks of uncontrolled type 2 diabetes discussed with patient including heart attacks, strokes.  I will contact endocrinology to see if she can be seen sooner.    I conducted a telehealth visit with the above named patient, which was completed using two-way, real-time interactive audio and video  communication. This has been done in good fabrice to provide continuity of care in the best interest of the provider-patient relationship, due to the COVID -19 public health crisis/national emergency where restrictions of face-to-face office visits are ongoing. Every conscious effort was taken to allow for sufficient and adequate time to complete the visit.  The patient was made aware of the limitations of the telehealth visit, including treatment limitations as no physical exam could be performed.  The patient was advised to call 911 or to go to the ER in case there was an emergency.  The patient was also advised of the potential privacy & security concerns related to the telehealth platform.   The patient was made aware of where to find [facility]'s notice of privacy practices, telehealth consent form and other related consent forms and documents.  which are located on the [facility] website. The patient verbally agreed to telehealth consent form, related consents and the risks discussed.    Lastly, the patient confirmed that they were in [place].   Included in this visit, time may have been spent reviewing labs, medications, radiology tests and decision making. Appropriate medical decision-making and tests are ordered as detailed in the plan of care above.  Coding/billing information is submitted for this visit based on complexity of care and/or time spent for the visit.               Responsible party/patient verbalized understanding of information discussed. No barriers to learning observed.            Orders This Visit:  No orders of the defined types were placed in this encounter.      Meds This Visit:  Requested Prescriptions      No prescriptions requested or ordered in this encounter       Imaging & Referrals:  None     Chaperone offered at visit today.     The 21st Century cures Act makes medical notes like these available to patients in the interest of transparency.  However, be advised that this is a medical document.   It is intended as peer to peer communication.  It is written in medical language and may contain abbreviations or verbiage that are unfamiliar.  It may appear blunt or direct.  Medical documents are intended to carry relevant information, facts as evident, and the clinical opinion of the practitioner.      This note was created by Dragon voice recognition. Errors in content may be related to improper recognition by the system; efforts to review and correct have been done but errors may still exist. Please contact me with any questions.       12/17/2024  Geoffrey Corea MD       [1]   Allergies  Allergen Reactions    Amoxicillin SWELLING    Dust     Pollen     Smoke

## 2024-12-17 NOTE — TELEPHONE ENCOUNTER
Please FU with the patient on BG and if she was able to start ozemoic if no reply by 12/19/2024   Thanks

## 2024-12-23 RX ORDER — ATORVASTATIN CALCIUM 20 MG/1
20 TABLET, FILM COATED ORAL NIGHTLY
Qty: 90 TABLET | Refills: 0 | Status: SHIPPED | OUTPATIENT
Start: 2024-12-23

## 2024-12-23 NOTE — TELEPHONE ENCOUNTER
Endocrine refill protocol for lipid lowering medications    Protocol Criteria:  PASSED Reason: N/A    If all below requirements are met, send a 90-day supply with 1 refill per provider protocol.    Verify appointment with Endocrinology completed in the last 6 months or scheduled in the next 3 months.  Lipid panel must have been completed in the last 12 months   ALT result below 80  LDL result below 130    Last completed office visit:10/14/2024 Wendy Baird MD   Next scheduled Follow up:   Future Appointments   Date Time Provider Department Center          3/4/2025  4:00 PM Wendy Baird MD [facility]      Last Lipid panel date: Cholesterol: 199, done on 3/14/2023.  HDL Cholesterol: 51, done on 3/14/2023.  TriGlycerides 146, done on 3/14/2023.  LDL Cholesterol: 122, done on 3/14/2023.     Last ALT result: Last ALT was 42 done on 3/14/2023.  Last AST was 32 done on 3/14/2023.

## 2025-01-04 NOTE — TELEPHONE ENCOUNTER
Endocrine Refill protocol for oral and injectable diabetic medications    Protocol Criteria:  FAILED  Reason: Elevated A1C    If all below requirements are met, send a 90-day supply with 1 refill per provider protocol.    Verify appointment with Endocrinology completed in the last 6 months or scheduled in the next 3 months.  Verify A1C has been completed within the last 6 months and is below 8.5%     Last completed office visit: 10/14/2024 Wendy Baird MD   Next scheduled Follow up:   Future Appointments   Date Time Provider Department Center   3/4/2025  4:00 PM Wendy Baird MD [facility]      Last A1c result: Last A1c value was 12.1% done 10/14/2024.    ===    Per last office visit on 10/14/24 with Dr. Baird, \"Start ozmepic with gradual dose tittration as discussed\".      Per TE 12/17/24 - called patient to confirm if she was able to start Rx, no answer, left detailed message to call back. [employer] message sent.

## 2025-01-06 RX ORDER — SEMAGLUTIDE 0.68 MG/ML
0.5 INJECTION, SOLUTION SUBCUTANEOUS WEEKLY
Qty: 9 ML | Refills: 0 | Status: SHIPPED | OUTPATIENT
Start: 2025-01-06

## 2025-02-20 RX ORDER — GLIPIZIDE 10 MG/1
TABLET, FILM COATED, EXTENDED RELEASE ORAL
Qty: 180 TABLET | Refills: 0 | Status: SHIPPED | OUTPATIENT
Start: 2025-02-20

## 2025-02-20 NOTE — TELEPHONE ENCOUNTER
Endocrine Refill protocol for oral and injectable diabetic medications    Protocol Criteria:  FAILED  Reason: Elevated A1C    If all below requirements are met, send a 90-day supply with 1 refill per provider protocol.    Verify appointment with Endocrinology completed in the last 6 months or scheduled in the next 3 months.  Verify A1C has been completed within the last 6 months and is below 8.5%     Last completed office visit: 10/14/2024 Wendy Baird MD   Next scheduled Follow up: No future appointments.   Last A1c result: Last A1c value was 12.1% done 10/14/2024.

## 2025-06-09 NOTE — TELEPHONE ENCOUNTER
Medications - Current[1]  amLODIPine 5 MG Oral Tab, Take 1 tablet (5 mg total) by mouth daily., Disp: 90 tablet, Rfl: 3        [1]   Current Outpatient Medications:     glipiZIDE ER 10 MG Oral Tablet 24 Hr, TAKE 2 TABLETS (20MG TOTAL) BY MOUTH EVERY DAY, Disp: 180 tablet, Rfl: 0    semaglutide (OZEMPIC, 0.25 OR 0.5 MG/DOSE,) 2 MG/3ML Subcutaneous Solution Pen-injector, Inject 0.5 mg into the skin once a week., Disp: 9 mL, Rfl: 0    atorvastatin 20 MG Oral Tab, Take 1 tablet (20 mg total) by mouth nightly., Disp: 90 tablet, Rfl: 0    amitriptyline 10 MG Oral Tab, Take 1-2 tablets (10-20 mg total) by mouth nightly., Disp: 180 tablet, Rfl: 0    Continuous Glucose Sensor (DEXCOM G7 SENSOR) Does not apply Misc, 1 each Every 10 days., Disp: 9 each, Rfl: 0    metFORMIN HCl 1000 MG Oral Tab, Take 1 tablet (1,000 mg total) by mouth 2 (two) times daily with meals., Disp: 180 tablet, Rfl: 0    pregabalin 50 MG Oral Cap, Take 1 capsule (50 mg total) by mouth nightly., Disp: 30 capsule, Rfl: 0    amLODIPine 5 MG Oral Tab, Take 1 tablet (5 mg total) by mouth daily., Disp: 90 tablet, Rfl: 3    DULoxetine 60 MG Oral Cap DR Particles, Take 2 capsules (120 mg total) by mouth daily., Disp: , Rfl:     Cetirizine HCl 10 MG Oral Cap, Take by mouth., Disp: , Rfl:     Acetaminophen (TYLENOL OR), Take by mouth., Disp: , Rfl:     Ibuprofen (ADVIL OR), Take by mouth., Disp: , Rfl:     Azelastine HCl 137 MCG/SPRAY Nasal Solution, 2 sprays by Nasal route 2 (two) times daily., Disp: 1 each, Rfl: 1    Lancets (ONETOUCH DELICA PLUS [id number]) Does not apply Misc, 1 strip by In Vitro route daily., Disp: 100 each, Rfl: 0    Glucose Blood (ONETOUCH VERIO) In Vitro Strip, Check sugars once a day, Disp: 100 strip, Rfl: 0    Blood Glucose Monitoring Suppl (ONETOUCH VERIO FLEX SYSTEM) w/Device Does not apply Kit, 1 each daily., Disp: 1 kit, Rfl: 0    albuterol 108 (90 Base) MCG/ACT Inhalation Aero Soln, Inhale 2 puffs into the lungs every 4 (four) hours  as needed for Wheezing., Disp: 18 g, Rfl: 3

## 2025-06-10 RX ORDER — AMLODIPINE BESYLATE 5 MG/1
5 TABLET ORAL DAILY
Qty: 90 TABLET | Refills: 3 | OUTPATIENT
Start: 2025-06-10

## (undated) NOTE — LETTER
1/28/2020          To Whom It May Concern:    Grant Flores is currently under my medical care and was seen in clinic for an acute medical visit.  Please excuse her absence from 1/28/20 - 1/29/20, she is cleared to return on 1/30/20 with no restric

## (undated) NOTE — Clinical Note
Thank you for the consult. I saw Ms. Baires in the endocrine/diabetes clinic today. Please see attached my note. Please feel free to contact me with any questions. Thanks!

## (undated) NOTE — LETTER
4/26/2024          To Whom It May Concern:    Trina Baires is currently under my medical care and seeing me for fibromyalgia.  She has diffuse joint and muscle pain that is chronic.  At this time I do not recommend for her to go back to work    If you require additional information please contact our office.        Sincerely,    Stephanie Molina MD          Document generated by:  Stephanie Molina MD

## (undated) NOTE — LETTER
3/14/2019              [address] [address]         Dear Rhonda Hart,    [facility] records indicate that the tests ordered for you by Gita Florence MD  have not been done.   If you have, in fact, alread

## (undated) NOTE — LETTER
4/30/2020          To Whom It May Concern:    Anju Locke is currently under my medical care and may not return to work at this time. She should be off work until [employer] testing completed and results are available in 1 to 2 days.     If you re

## (undated) NOTE — LETTER
11/14/2018              [address] [place] [address] [address] [address]         Dear Joel Stewart,    This letter is to inform you that our office has made several attempts to reach you by phone without success.   We were attem

## (undated) NOTE — LETTER
11/13/2018          To Whom It May Concern:    Sheryl Reyes is currently under my medical care and was seen in clinic for an acute medical condition. Please excuse her absence on 11/13/18- 11/14/18.  She is cleared to return on 11/15/18 with no re

## (undated) NOTE — LETTER
3/1/2024              Trina Baires        [address] [facility] [address]       3/1/2024              Trina Baires        [address] [facility] [address]         To Whom It May Concern,    Trina Baires is under my medical care.  The following are my professional recommendations:    - no lifting anything over 10 pounds  - do not walk dogs over 40 pounds  - allow use of a chair for sitting breaks as necessary  - allow use of supportive ankle brace, brace should not get wet    Please allow these work restrictions until April 26, 2024.    Sincerely,      Geoffrey Corea MD  [facility], [address] [id number] [phone]

## (undated) NOTE — LETTER
10/1/2019              Winferd Goltz        [facility] [address] [id number]         Dear Roderick Crooks,    [facility] records indicate that the tests ordered for you by Jaleesa Bentley MD  have not been done.   If you have, in fact, already comple

## (undated) NOTE — LETTER
6/2/2020              Grant Stevens        [facility] [address] [id number]         Dear Rhonda Hart,    [address] [facility] records indicate that the tests ordered for you by Gita Florence MD  have not been done.   If you have, in fact, already complet

## (undated) NOTE — LETTER
Date & Time: 12/30/2018, 8:59 AM  Patient: Dana Mccabe  Encounter Provider(s):    Concetta Cardoso MD       To Whom It May Concern:    Dana Mccbae was seen and treated in our department on 12/30/2018.  She should not return to work